# Patient Record
Sex: FEMALE | Race: WHITE
[De-identification: names, ages, dates, MRNs, and addresses within clinical notes are randomized per-mention and may not be internally consistent; named-entity substitution may affect disease eponyms.]

---

## 2018-04-10 ENCOUNTER — RESULT REVIEW (OUTPATIENT)
Age: 48
End: 2018-04-10

## 2020-07-21 ENCOUNTER — EMERGENCY (EMERGENCY)
Facility: HOSPITAL | Age: 50
LOS: 0 days | Discharge: HOME | End: 2020-07-21
Attending: EMERGENCY MEDICINE
Payer: COMMERCIAL

## 2020-07-21 ENCOUNTER — TRANSCRIPTION ENCOUNTER (OUTPATIENT)
Age: 50
End: 2020-07-21

## 2020-07-21 VITALS — WEIGHT: 210.1 LBS | HEIGHT: 65 IN

## 2020-07-21 VITALS
OXYGEN SATURATION: 100 % | RESPIRATION RATE: 17 BRPM | SYSTOLIC BLOOD PRESSURE: 148 MMHG | HEART RATE: 102 BPM | DIASTOLIC BLOOD PRESSURE: 97 MMHG

## 2020-07-21 DIAGNOSIS — E03.9 HYPOTHYROIDISM, UNSPECIFIED: ICD-10-CM

## 2020-07-21 DIAGNOSIS — E87.6 HYPOKALEMIA: ICD-10-CM

## 2020-07-21 DIAGNOSIS — R00.2 PALPITATIONS: ICD-10-CM

## 2020-07-21 DIAGNOSIS — I47.1 SUPRAVENTRICULAR TACHYCARDIA: ICD-10-CM

## 2020-07-21 DIAGNOSIS — Z91.14 PATIENT'S OTHER NONCOMPLIANCE WITH MEDICATION REGIMEN: ICD-10-CM

## 2020-07-21 LAB
ADD ON TEST-SPECIMEN IN LAB: SIGNIFICANT CHANGE UP
ALBUMIN SERPL ELPH-MCNC: 3.2 G/DL — LOW (ref 3.3–5)
ALP SERPL-CCNC: 68 U/L — SIGNIFICANT CHANGE UP (ref 40–120)
ALT FLD-CCNC: 16 U/L — SIGNIFICANT CHANGE UP (ref 12–78)
ANION GAP SERPL CALC-SCNC: 6 MMOL/L — SIGNIFICANT CHANGE UP (ref 5–17)
APTT BLD: 30.9 SEC — SIGNIFICANT CHANGE UP (ref 27.5–35.5)
AST SERPL-CCNC: 14 U/L — LOW (ref 15–37)
BASOPHILS # BLD AUTO: 0.07 K/UL — SIGNIFICANT CHANGE UP (ref 0–0.2)
BASOPHILS NFR BLD AUTO: 0.8 % — SIGNIFICANT CHANGE UP (ref 0–2)
BILIRUB SERPL-MCNC: 0.3 MG/DL — SIGNIFICANT CHANGE UP (ref 0.2–1.2)
BUN SERPL-MCNC: 13 MG/DL — SIGNIFICANT CHANGE UP (ref 7–23)
CALCIUM SERPL-MCNC: 8.2 MG/DL — LOW (ref 8.5–10.1)
CHLORIDE SERPL-SCNC: 107 MMOL/L — SIGNIFICANT CHANGE UP (ref 96–108)
CO2 SERPL-SCNC: 25 MMOL/L — SIGNIFICANT CHANGE UP (ref 22–31)
CREAT SERPL-MCNC: 0.98 MG/DL — SIGNIFICANT CHANGE UP (ref 0.5–1.3)
EOSINOPHIL # BLD AUTO: 0.32 K/UL — SIGNIFICANT CHANGE UP (ref 0–0.5)
EOSINOPHIL NFR BLD AUTO: 3.5 % — SIGNIFICANT CHANGE UP (ref 0–6)
GLUCOSE SERPL-MCNC: 96 MG/DL — SIGNIFICANT CHANGE UP (ref 70–99)
HCT VFR BLD CALC: 35.3 % — SIGNIFICANT CHANGE UP (ref 34.5–45)
HGB BLD-MCNC: 11.4 G/DL — LOW (ref 11.5–15.5)
IMM GRANULOCYTES NFR BLD AUTO: 0.3 % — SIGNIFICANT CHANGE UP (ref 0–1.5)
INR BLD: 1.13 RATIO — SIGNIFICANT CHANGE UP (ref 0.88–1.16)
LYMPHOCYTES # BLD AUTO: 2.55 K/UL — SIGNIFICANT CHANGE UP (ref 1–3.3)
LYMPHOCYTES # BLD AUTO: 28.1 % — SIGNIFICANT CHANGE UP (ref 13–44)
MCHC RBC-ENTMCNC: 26.8 PG — LOW (ref 27–34)
MCHC RBC-ENTMCNC: 32.3 GM/DL — SIGNIFICANT CHANGE UP (ref 32–36)
MCV RBC AUTO: 83.1 FL — SIGNIFICANT CHANGE UP (ref 80–100)
MONOCYTES # BLD AUTO: 0.48 K/UL — SIGNIFICANT CHANGE UP (ref 0–0.9)
MONOCYTES NFR BLD AUTO: 5.3 % — SIGNIFICANT CHANGE UP (ref 2–14)
NEUTROPHILS # BLD AUTO: 5.62 K/UL — SIGNIFICANT CHANGE UP (ref 1.8–7.4)
NEUTROPHILS NFR BLD AUTO: 62 % — SIGNIFICANT CHANGE UP (ref 43–77)
NT-PROBNP SERPL-SCNC: 91 PG/ML — SIGNIFICANT CHANGE UP (ref 0–125)
PLATELET # BLD AUTO: 224 K/UL — SIGNIFICANT CHANGE UP (ref 150–400)
POTASSIUM SERPL-MCNC: 3.3 MMOL/L — LOW (ref 3.5–5.3)
POTASSIUM SERPL-SCNC: 3.3 MMOL/L — LOW (ref 3.5–5.3)
PROT SERPL-MCNC: 8.9 GM/DL — HIGH (ref 6–8.3)
PROTHROM AB SERPL-ACNC: 13.1 SEC — SIGNIFICANT CHANGE UP (ref 10.6–13.6)
RBC # BLD: 4.25 M/UL — SIGNIFICANT CHANGE UP (ref 3.8–5.2)
RBC # FLD: 14.5 % — SIGNIFICANT CHANGE UP (ref 10.3–14.5)
SODIUM SERPL-SCNC: 138 MMOL/L — SIGNIFICANT CHANGE UP (ref 135–145)
TROPONIN I SERPL-MCNC: <0.015 NG/ML — SIGNIFICANT CHANGE UP (ref 0.01–0.04)
TSH SERPL-MCNC: 46.3 UU/ML — HIGH (ref 0.34–4.82)
WBC # BLD: 9.07 K/UL — SIGNIFICANT CHANGE UP (ref 3.8–10.5)
WBC # FLD AUTO: 9.07 K/UL — SIGNIFICANT CHANGE UP (ref 3.8–10.5)

## 2020-07-21 PROCEDURE — 85025 COMPLETE CBC W/AUTO DIFF WBC: CPT

## 2020-07-21 PROCEDURE — 84439 ASSAY OF FREE THYROXINE: CPT

## 2020-07-21 PROCEDURE — 84480 ASSAY TRIIODOTHYRONINE (T3): CPT

## 2020-07-21 PROCEDURE — 36415 COLL VENOUS BLD VENIPUNCTURE: CPT

## 2020-07-21 PROCEDURE — 93010 ELECTROCARDIOGRAM REPORT: CPT

## 2020-07-21 PROCEDURE — 99285 EMERGENCY DEPT VISIT HI MDM: CPT

## 2020-07-21 PROCEDURE — 80053 COMPREHEN METABOLIC PANEL: CPT

## 2020-07-21 PROCEDURE — 84484 ASSAY OF TROPONIN QUANT: CPT

## 2020-07-21 PROCEDURE — 93005 ELECTROCARDIOGRAM TRACING: CPT

## 2020-07-21 PROCEDURE — 85610 PROTHROMBIN TIME: CPT

## 2020-07-21 PROCEDURE — 85730 THROMBOPLASTIN TIME PARTIAL: CPT

## 2020-07-21 PROCEDURE — 99284 EMERGENCY DEPT VISIT MOD MDM: CPT | Mod: 25

## 2020-07-21 PROCEDURE — 84443 ASSAY THYROID STIM HORMONE: CPT

## 2020-07-21 PROCEDURE — 71045 X-RAY EXAM CHEST 1 VIEW: CPT | Mod: 26

## 2020-07-21 PROCEDURE — 83880 ASSAY OF NATRIURETIC PEPTIDE: CPT

## 2020-07-21 PROCEDURE — 71045 X-RAY EXAM CHEST 1 VIEW: CPT

## 2020-07-21 RX ORDER — POTASSIUM CHLORIDE 20 MEQ
40 PACKET (EA) ORAL ONCE
Refills: 0 | Status: COMPLETED | OUTPATIENT
Start: 2020-07-21 | End: 2020-07-21

## 2020-07-21 RX ORDER — ASPIRIN/CALCIUM CARB/MAGNESIUM 324 MG
162 TABLET ORAL ONCE
Refills: 0 | Status: COMPLETED | OUTPATIENT
Start: 2020-07-21 | End: 2020-07-21

## 2020-07-21 RX ORDER — METOPROLOL TARTRATE 50 MG
1 TABLET ORAL
Qty: 30 | Refills: 5
Start: 2020-07-21 | End: 2021-01-16

## 2020-07-21 RX ADMIN — Medication 40 MILLIEQUIVALENT(S): at 16:51

## 2020-07-21 RX ADMIN — Medication 162 MILLIGRAM(S): at 16:50

## 2020-07-21 NOTE — CONSULT NOTE ADULT - SUBJECTIVE AND OBJECTIVE BOX
HPI:    50y Female who was admiited for  rapid heart rate.  She was found to be in SVT, was given Adenosine 6 mgs in the ambulance, which thn converted to SR-St.  She said she had a very bad week and was feeling stressed with her 4 children, COVID and health issues with her mother.  Her heart started to race, when she was in the ambulance they told her that her mother had a cardiac arrest at [facility]  today. She has a hsitory of hypothyroidism and hasn't taken her Thyroid medication in over a wekk. In the ED her Potassium level is 3.3/    PAST MEDICAL & SURGICAL HISTORY:  Hypothyroidism          MEDICATIONS  (STANDING):  aspirin  chewable 162 milliGRAM(s) Oral once  potassium chloride    Tablet ER 40 milliEquivalent(s) Oral once    MEDICATIONS  (PRN):      Allergies    No Known Allergies    Intolerances        SOCIAL HISTORY: Denies tobacco, etoh abuse or illicit drug use    FAMILY HISTORY:      Vital Signs Last 24 Hrs  T(C): 36.9 (21 Jul 2020 15:49), Max: 36.9 (21 Jul 2020 15:49)  T(F): 98.5 (21 Jul 2020 15:49), Max: 98.5 (21 Jul 2020 15:49)  HR: 113 (21 Jul 2020 15:49) (113 - 113)  BP: 134/59 (21 Jul 2020 15:49) (134/59 - 134/59)  BP(mean): 82 (21 Jul 2020 15:49) (82 - 82)  RR: 18 (21 Jul 2020 15:49) (18 - 18)  SpO2: 99% (21 Jul 2020 15:49) (99% - 99%)    REVIEW OF SYSTEMS:    CONSTITUTIONAL:  As per HPI.  HEENT:  Eyes:  No diplopia or blurred vision. ENT:  No earache, sore throat or runny nose.  CARDIOVASCULAR:  No pressure, squeezing, strangling, tightness, heaviness or aching about the chest, neck, axilla or epigastrium.  RESPIRATORY:  No cough, shortness of breath, PND or orthopnea.  GASTROINTESTINAL:  No nausea, vomiting or diarrhea.  GENITOURINARY:  No dysuria, frequency or urgency.  MUSCULOSKELETAL:  As per HPI.  SKIN:  No change in skin, hair or nails.  NEUROLOGIC:  No paresthesias, fasciculations, seizures or weakness.  PSYCHIATRIC:  No disorder of thought or mood.  ENDOCRINE:  No heat or cold intolerance, polyuria or polydipsia.  HEMATOLOGICAL:  No easy bruising or bleedings:  .     PHYSICAL EXAMINATION:    GENERAL APPEARANCE:  Pt. is not currently dyspneic, in no distress. Pt. is alert, oriented, and pleasant.  HEENT:  Pupils are normal and react normally. No icterus. Mucous membranes well colored.  NECK:  Supple. No lymphadenopathy. Jugular venous pressure not elevated. Carotids equal.   HEART:   The cardiac impulse has a normal quality. There are no murmurs, rubs or gallops noted  CHEST:  Chest is clear to auscultation. Normal respiratory effort.  ABDOMEN:  Soft and nontender.   EXTREMITIES:  There is no edema.   SKIN:  No rash or significant lesions are noted.    I&O's Summary      LABS:                        11.4   9.07  )-----------( 224      ( 21 Jul 2020 15:51 )             35.3     07-21    138  |  107  |  13  ----------------------------<  96  3.3<L>   |  25  |  0.98    Ca    8.2<L>      21 Jul 2020 15:51    TPro  8.9<H>  /  Alb  3.2<L>  /  TBili  0.3  /  DBili  x   /  AST  14<L>  /  ALT  16  /  AlkPhos  68  07-21    LIVER FUNCTIONS - ( 21 Jul 2020 15:51 )  Alb: 3.2 g/dL / Pro: 8.9 gm/dL / ALK PHOS: 68 U/L / ALT: 16 U/L / AST: 14 U/L / GGT: x           PT/INR - ( 21 Jul 2020 15:51 )   PT: 13.1 sec;   INR: 1.13 ratio         PTT - ( 21 Jul 2020 15:51 )  PTT:30.9 sec  CARDIAC MARKERS ( 21 Jul 2020 15:51 )  <0.015 ng/mL / x     / x     / x     / x                EKG:   BPM with ST t waves abnormalities ( pt had SVT- and was given Adenosine).    TELEMETRY:  -105 BPM, no ectopic beats    CARDIAC TESTS:    RADIOLOGY & ADDITIONAL STUDIES:

## 2020-07-21 NOTE — ED PROVIDER NOTE - NSFOLLOWUPINSTRUCTIONS_ED_ALL_ED_FT
Supraventricular Tachycardia, Adult  Supraventricular tachycardia (SVT) is a type of abnormal heart rhythm. It causes the heart to beat very quickly and then return to a normal speed.  A normal resting heart rate is 60–100 beats per minute. During an episode of SVT, your heart rate may be higher than 150 beats per minute. Episodes of SVT can be frightening, but they are usually not dangerous. However, if episodes happen several times per day or last longer than a few seconds, they may lead to heart failure.  What are the causes?     Usually, a normal heartbeat starts when an area called the sinoatrial node releases an electrical signal. In SVT, other areas of the heart send out electrical signals that interfere with the signal from the sinoatrial node. It is not known why some people get SVT and others do not.  What increases the risk?  You are more likely to develop this condition if you are:  12–30 years old.A woman.The following factors may make you more likely to develop this condition:  Stress.Tiredness.Smoking.Stimulant drugs, such as cocaine and methamphetamine.Alcohol.Caffeine.Pregnancy.Anxiety.What are the signs or symptoms?  Symptoms of this condition include:  A pounding heart.A feeling that the heart is skipping beats (palpitations).Weakness.Shortness of breath.Tightness or pain in your chest.Light-headedness.Anxiety.Dizziness.Sweating.Nausea.Fainting.Fatigue or tiredness.A mild episode may not cause symptoms.  How is this diagnosed?  This condition may be diagnosed based on:  Your symptoms.A physical exam.  If you have an episode of SVT during the exam, the health care provider may be able to diagnose SVT by listening to your heart and feeling your pulse.Tests. These may include:  An electrocardiogram (ECG). This test is done to check for problems with electrical activity in the heart.A Holter monitor or event monitor test. This test involves wearing a portable device that monitors your heart rate over time.An echocardiogram. This test involves taking an image of your heart using sound waves. It is done to rule out other causes of a fast heart rate.Blood tests.How is this treated?  This condition may be treated with:  Vagal nerve stimulation. The treatment involves stimulating your vagus nerve, which slows down the heart. It is often the first and only treatment that is needed for this condition. Work with your health care provider to find which one works best for you. Ways to do this treatment include:  Holding your breath and pushing, as though you are having a bowel movement.Massaging an area on one side of your neck, below your jaw. Do not try this yourself. Only a health care provider should do this. If done the wrong way, it can lead to a stroke.Bending forward with your head between your legs.Coughing while bending forward with your head between your legs.Closing your eyes and massaging your eyeballs. A health care provider should guide you through this method before you try it on your own.Medicines that prevent attacks.Medicine to stop an attack. The medicine is given through an IV at the hospital.A small electric shock (cardioversion) that stops an attack. Before you get the shock, you will get medicine to make you fall asleep.Radiofrequency ablation. In this procedure, a small, thin tube (catheter) is used to send radiofrequency energy to the area of tissue that is causing the rapid heartbeats. The energy kills the cells and helps your heart keep a normal rhythm. You may have this treatment if you have symptoms of SVT often.If you do not have symptoms, you may not need treatment.  Follow these instructions at home:  Stress     Avoid stressful situations when possible.Find healthy ways of managing stress that work for you. Some healthy ways to manage stress include:  Taking part in relaxing activities, such as yoga, meditation, or being out in nature.Listening to relaxing music.Practicing relaxation techniques, such as deep breathing.Leading a healthy lifestyle. This involves getting plenty of sleep, exercising, and eating a balanced diet.Attending counseling or talk therapy with a mental health professional.Lifestyle        Try to get at least 7 hours of sleep each night.Do not use any products that contain nicotine or tobacco, such as cigarettes, e-cigarettes, and chewing tobacco. If you need help quitting, ask your health care provider.Be aware of how alcohol affects your condition. If alcohol:  Triggers episodes of SVT, do not drink alcohol.Does not seem to trigger episodes, limit alcohol intake to no more than 1 drink a day for nonpregnant women and 2 drinks a day for men. Be aware of how much alcohol is in your drink. In the [place] one drink equals one 12 oz bottle of beer (355 mL), one 5 oz glass of wine (148 mL), or one 1½ oz glass of hard liquor (44 mL).Be aware of how caffeine affects your condition. If caffeine:  Triggers episodes of SVT, do not eat, drink, or use anything with caffeine in it.Does not seem to trigger episodes, consume caffeine in moderation.Do not use stimulant drugs. If you need help quitting, talk with your health care provider.General instructions     Maintain a healthy weight.Exercise regularly. Ask your health care provider to suggest some good activities for you. Aim for one or a combination of the followin minutes per week of moderate exercise, such as walking or yoga.75 minutes per week of vigorous exercise, such as running or swimming.Perform vagus nerve stimulation as directed by your health care provider.Take over-the-counter and prescription medicines only as told by your health care provider.Keep all follow-up visits as told by your health care provider. This is important.Contact a health care provider if:  You have episodes of SVT more often than before.Episodes of SVT last longer than before.Vagus nerve stimulation is no longer helping.You have new symptoms.Get help right away if:  You have chest pain.Your symptoms get worse.You have trouble breathing.You have an episode of SVT that lasts longer than 20 minutes.You faint.These symptoms may represent a serious problem that is an emergency. Do not wait to see if the symptoms will go away. Get medical help right away. Call your local emergency services (911 in the [place] Do not drive yourself to the hospital.   Summary  Supraventricular tachycardia (SVT) is a type of abnormal heart rhythm.During an episode of SVT, your heart rate may be higher than 150 beats per minute.Treatment depends on frequency of occurrence and symptoms experienced.This information is not intended to replace advice given to you by your health care provider. Make sure you discuss any questions you have with your health care provider.    Document Released: 2006 Document Revised: 2019 Document Reviewed: 2019  Elsevier Patient Education ©  Elsevier Inc.

## 2020-07-21 NOTE — ED PROVIDER NOTE - OBJECTIVE STATEMENT
51 y/o female with  PMHx of hypothyroidism presents to the ED c/o rapid heart rate. Pt endorses feeling anxious, palpitations while driving, felt faint and pulled into parking lot of urgent care. They told her to com to ED. Pt received news of her mother going into cardiac arrest moments before being brought to ED. Pt reports similar episodes of "anxiety" but none as sever as this one. Was give 6 of adenosine at urgent care, HR brought down from 190's to low 100's.

## 2020-07-21 NOTE — ED ADULT TRIAGE NOTE - CHIEF COMPLAINT QUOTE
BIBA TO ED FROM URGENT CARE FOR RAPID HEART BEAT. WHEN EMS ARRIVED THEY REPORTED SVT 'S, ADENOSINE 6MG GIVEN, EMS STATED SVT BROKE. PT THEN RECEIVED A PHONE CALL STATING THAT PTS MOTHER WAS GETTING CPR @[facility] THEN HR ELEVATED BACK INTO 120'S. PT W/O SOB AND CHEST PAIN

## 2020-07-21 NOTE — ED PROVIDER NOTE - PATIENT PORTAL LINK FT
You can access the FollowMyHealth Patient Portal offered by [facility] by registering at the following website: [email or web address]. By joining "[employer]"’s FollowMyHealth portal, you will also be able to view your health information using other applications (apps) compatible with our system.

## 2020-07-21 NOTE — CONSULT NOTE ADULT - ASSESSMENT
ASSESSMENT & PLAN:  50 year old female who hasn't been complaint with thyroid medication and found to be hypokalemic.  She has been feeling very stressed.  She had had palpitations and was feeling near syncope  when she pulled into urgent care, she was in her car. The  ambulances called   given Adenosine 6 mgs for SVT in the ambulance and then  she was notified that her mother had a cardiac arrest today.  She is anxious to get discharged and see her mother.  Troponin Level was Normal .    She will f/u with Dr. Marr as an out-patient  She will increase potassium rich foods in her diet  She will be compliant with her Thyroid medication  A Rx for Metoprolol Tartrate 25 mgs will be called into her pharmacy for break through episodes  of SVT  Avoid caffeine and stimulants       Thank-you for letting the EP Service  participate in the care of your patient.

## 2020-07-27 PROBLEM — E03.9 HYPOTHYROIDISM, UNSPECIFIED: Chronic | Status: ACTIVE | Noted: 2020-07-21

## 2020-08-04 PROBLEM — Z00.00 ENCOUNTER FOR PREVENTIVE HEALTH EXAMINATION: Status: ACTIVE | Noted: 2020-08-04

## 2020-08-04 RX ORDER — METOPROLOL TARTRATE 25 MG/1
25 TABLET, FILM COATED ORAL
Refills: 0 | Status: ACTIVE | COMMUNITY
Start: 2020-08-04

## 2020-08-04 RX ORDER — LEVOTHYROXINE SODIUM 0.15 MG/1
150 TABLET ORAL DAILY
Refills: 0 | Status: ACTIVE | COMMUNITY
Start: 2020-08-04 | End: 1900-01-01

## 2020-08-07 ENCOUNTER — NON-APPOINTMENT (OUTPATIENT)
Age: 50
End: 2020-08-07

## 2020-08-07 ENCOUNTER — APPOINTMENT (OUTPATIENT)
Dept: ELECTROPHYSIOLOGY | Facility: CLINIC | Age: 50
End: 2020-08-07
Payer: COMMERCIAL

## 2020-08-07 VITALS
SYSTOLIC BLOOD PRESSURE: 136 MMHG | OXYGEN SATURATION: 99 % | DIASTOLIC BLOOD PRESSURE: 76 MMHG | HEART RATE: 82 BPM | HEIGHT: 66 IN | WEIGHT: 213 LBS | BODY MASS INDEX: 34.23 KG/M2

## 2020-08-07 DIAGNOSIS — I47.1 SUPRAVENTRICULAR TACHYCARDIA: ICD-10-CM

## 2020-08-07 DIAGNOSIS — E03.9 HYPOTHYROIDISM, UNSPECIFIED: ICD-10-CM

## 2020-08-07 PROCEDURE — 93000 ELECTROCARDIOGRAM COMPLETE: CPT

## 2020-08-07 PROCEDURE — 99205 OFFICE O/P NEW HI 60 MIN: CPT

## 2020-08-07 RX ORDER — LEVOTHYROXINE SODIUM 150 UG/1
150 TABLET ORAL DAILY
Qty: 60 | Refills: 0 | Status: ACTIVE | COMMUNITY
Start: 2020-08-07 | End: 1900-01-01

## 2020-08-07 NOTE — DISCUSSION/SUMMARY
[FreeTextEntry1] : 50y Female who has hypothyroidism was admiited to  for rapid heart rate on 7/21/2020. She was found to be in SVT, was given Adenosine 6mg in the ambulance and converted to SR. 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 Discussed with her that EPS and ablation as a definitive therapy is appropriate option vs watchful waiting and continuing toprolol prn is ok as well. \par pt would like to continue with metoprolol only for now\par TTE to evaluate for LV function\par hypothyroidism continue synthroid and follow with PCP will refer to Dr Wright\par r/o HUNTER will refer for sleep study\par rtc in 6 month or earlier if symptomatic

## 2020-08-07 NOTE — HISTORY OF PRESENT ILLNESS
[FreeTextEntry1] : 50y Female who has hypothyroidism was admiited to  for rapid heart rate on 7/21/2020. She was found to be in SVT, was given Adenosine 6 mg in the ambulance and converted to SR-St. She said she had a very bad week and was feeling stressed. She feels overwhelmed and sad, her mother passed away recently.  She takes metoprolol xl 25mg prn. She has palpitations every 6 months or so and they last up to 30 minutes terminates with bearing down usually. Last SVT episode was long and she could not terminate on her own. Denies chest pain, syncope, sob. \par ECG 8/7/20 shows SR nmp CT qrs

## 2020-08-07 NOTE — PHYSICAL EXAM
[Normal Appearance] : normal appearance [Well Groomed] : well groomed [Normal Conjunctiva] : the conjunctiva exhibited no abnormalities [Heart Rate And Rhythm] : heart rate and rhythm were normal [Normal Oral Mucosa] : normal oral mucosa [Heart Sounds] : normal S1 and S2 [Murmurs] : no murmurs present [] : no respiratory distress [Abnormal Walk] : normal gait [Bowel Sounds] : normal bowel sounds [Skin Color & Pigmentation] : normal skin color and pigmentation [Nail Clubbing] : no clubbing of the fingernails [Skin Turgor] : normal skin turgor [Oriented To Time, Place, And Person] : oriented to person, place, and time [Affect] : the affect was normal [Impaired Insight] : insight and judgment were intact

## 2020-10-05 ENCOUNTER — RX CHANGE (OUTPATIENT)
Age: 50
End: 2020-10-05

## 2022-05-10 ENCOUNTER — APPOINTMENT (OUTPATIENT)
Dept: OBGYN | Facility: CLINIC | Age: 52
End: 2022-05-10

## 2024-02-10 ENCOUNTER — EMERGENCY (EMERGENCY)
Facility: HOSPITAL | Age: 54
LOS: 0 days | Discharge: HOME | End: 2024-02-10
Attending: STUDENT IN AN ORGANIZED HEALTH CARE EDUCATION/TRAINING PROGRAM
Payer: COMMERCIAL

## 2024-02-10 VITALS
OXYGEN SATURATION: 100 % | TEMPERATURE: 98 F | HEART RATE: 62 BPM | RESPIRATION RATE: 18 BRPM | DIASTOLIC BLOOD PRESSURE: 85 MMHG | SYSTOLIC BLOOD PRESSURE: 128 MMHG

## 2024-02-10 VITALS — WEIGHT: 210.1 LBS | HEIGHT: 67 IN

## 2024-02-10 DIAGNOSIS — R10.13 EPIGASTRIC PAIN: ICD-10-CM

## 2024-02-10 DIAGNOSIS — Z87.19 PERSONAL HISTORY OF OTHER DISEASES OF THE DIGESTIVE SYSTEM: ICD-10-CM

## 2024-02-10 DIAGNOSIS — E03.9 HYPOTHYROIDISM, UNSPECIFIED: ICD-10-CM

## 2024-02-10 DIAGNOSIS — R11.2 NAUSEA WITH VOMITING, UNSPECIFIED: ICD-10-CM

## 2024-02-10 PROCEDURE — 99282 EMERGENCY DEPT VISIT SF MDM: CPT

## 2024-02-10 PROCEDURE — 99283 EMERGENCY DEPT VISIT LOW MDM: CPT

## 2024-02-10 NOTE — ED STATDOCS - PROGRESS NOTE DETAILS
Patient seen and examined at bedside, she reports this morning she felt like something was stuck when she swallowed, this is now relieved. She would like to try water/crackers.  PE: non toxic, abd soft NTND no guarding  PO challenge pending Patient tolerating water and crackers without issue, no further vomiting or pain. She wants to go home and follow up with her doctor, does not want further w/u here. Discussion includes plan and return precautions. Pt advised to f/u with PMD 1-2 days and specialists discussed.  Pt verbalized understanding/agreement of plan.

## 2024-02-10 NOTE — ED STATDOCS - NSFOLLOWUPINSTRUCTIONS_ED_ALL_ED_FT
- Please follow up with your Primary Care Doctor in 1 - 2 days. If you cannot follow-up with your primary care doctor please return to the Emergency Department for any urgent issues.  - Seek immediate medical care for any new, worsening or concerning signs or symptoms.   - Follow up with your surgeon and gastroenterologist     - If you have difficulty following up, please call: [phone]) or go to [email or web address] to obtain a [facility] doctor or specialist who takes your insurance in your area.    Feel better!

## 2024-02-10 NOTE — ED ADULT TRIAGE NOTE - CHIEF COMPLAINT QUOTE
patient has known hiatal hernia; reports that anything she swallows she feels like it is stuck and trying to come back up since this morning. endorsing intermittent mid chest discomfort. follow with GI.

## 2024-02-10 NOTE — ED ADULT NURSE NOTE - CHIEF COMPLAINT
The patient is a 53y Female complaining of see chief complaint quote.
0 (no pain/absence of nonverbal indicators of pain)

## 2024-02-10 NOTE — ED STATDOCS - PHYSICAL EXAMINATION
GENERAL: A&Ox4, non-toxic appearing, no acute distress  HEENT: NCAT, EOMI, oral mucosa moist, normal conjunctiva  RESP: no respiratory distress, speaking in full sentences  CV: RRR  ABDOMEN: soft, non-tender, non-distended, no guarding, no rebound tenderness  MSK: no visible deformities  NEURO: no focal sensory or motor deficits, CN 2-12 grossly intact  SKIN: warm, normal color, well perfused, no rash  PSYCH: normal affect

## 2024-02-10 NOTE — ED STATDOCS - OBJECTIVE STATEMENT
52 yo female w/PMHx hypothyroidism and known hiatal hernia presents to the ED reporting that it feels like when she swallows something it gets stuck and is trying to come back up. Pt ate this morning and drank a small amount of water, and was not able to keep it down. Had 1 episode of vomiting PTA. In the ED feels like she had to vomit and then when she tried to she felt "something move", that has provided her some relief. Pt is on Omeprazol. 54 yo female w/PMHx hypothyroidism and known hiatal hernia presents to the ED reporting that it feels like when she swallows something it gets stuck and is trying to come back up. Pt ate this morning and drank a small amount of water, and was not able to keep it down. Had 1 episode of vomiting PTA. In the ED feels like she had to vomit and then when she tried to she felt "something move", that has provided her some relief. Pt is on Omeprazole.

## 2024-02-10 NOTE — ED ADULT NURSE NOTE - OBJECTIVE STATEMENT
pt has a known history of hiatal hernia, states that anything that she tries to eat "comes back up" endorses mild upper abdominal discomfort. denies cp/fever/ v/d/sob.

## 2024-02-10 NOTE — ED STATDOCS - PATIENT PORTAL LINK FT
You can access the FollowMyHealth Patient Portal offered by [facility] by registering at the following website: [email or web address]. By joining [employer]’s FollowMyHealth portal, you will also be able to view your health information using other applications (apps) compatible with our system.

## 2024-11-21 ENCOUNTER — APPOINTMENT (OUTPATIENT)
Dept: OBGYN | Facility: CLINIC | Age: 54
End: 2024-11-21

## 2024-11-21 PROCEDURE — 99396 PREV VISIT EST AGE 40-64: CPT

## 2024-11-21 PROCEDURE — 99459 PELVIC EXAMINATION: CPT

## 2024-11-21 PROCEDURE — 82270 OCCULT BLOOD FECES: CPT

## 2024-11-21 PROCEDURE — 81002 URINALYSIS NONAUTO W/O SCOPE: CPT

## 2024-12-31 ENCOUNTER — INPATIENT (INPATIENT)
Facility: HOSPITAL | Age: 54
LOS: 2 days | Discharge: HOME | DRG: 310 | End: 2025-01-03
Attending: HOSPITALIST | Admitting: FAMILY MEDICINE
Payer: COMMERCIAL

## 2024-12-31 VITALS
WEIGHT: 199.96 LBS | OXYGEN SATURATION: 94 % | SYSTOLIC BLOOD PRESSURE: 94 MMHG | HEART RATE: 178 BPM | HEIGHT: 67 IN | DIASTOLIC BLOOD PRESSURE: 72 MMHG

## 2024-12-31 DIAGNOSIS — I47.10 SUPRAVENTRICULAR TACHYCARDIA, UNSPECIFIED: ICD-10-CM

## 2024-12-31 DIAGNOSIS — Z96.7 PRESENCE OF OTHER BONE AND TENDON IMPLANTS: Chronic | ICD-10-CM

## 2024-12-31 DIAGNOSIS — R79.89 OTHER SPECIFIED ABNORMAL FINDINGS OF BLOOD CHEMISTRY: ICD-10-CM

## 2024-12-31 LAB
ADD ON TEST-SPECIMEN IN LAB: SIGNIFICANT CHANGE UP
ALBUMIN SERPL ELPH-MCNC: 3.1 G/DL — LOW (ref 3.3–5)
ALP SERPL-CCNC: 76 U/L — SIGNIFICANT CHANGE UP (ref 40–120)
ALT FLD-CCNC: 18 U/L — SIGNIFICANT CHANGE UP (ref 12–78)
ANION GAP SERPL CALC-SCNC: 5 MMOL/L — SIGNIFICANT CHANGE UP (ref 5–17)
APTT BLD: 29.8 SEC — SIGNIFICANT CHANGE UP (ref 24.5–35.6)
AST SERPL-CCNC: 21 U/L — SIGNIFICANT CHANGE UP (ref 15–37)
BASOPHILS # BLD AUTO: 0.07 K/UL — SIGNIFICANT CHANGE UP (ref 0–0.2)
BASOPHILS NFR BLD AUTO: 0.8 % — SIGNIFICANT CHANGE UP (ref 0–2)
BILIRUB SERPL-MCNC: 0.2 MG/DL — SIGNIFICANT CHANGE UP (ref 0.2–1.2)
BUN SERPL-MCNC: 22 MG/DL — SIGNIFICANT CHANGE UP (ref 7–23)
CALCIUM SERPL-MCNC: 8.6 MG/DL — SIGNIFICANT CHANGE UP (ref 8.5–10.1)
CHLORIDE SERPL-SCNC: 106 MMOL/L — SIGNIFICANT CHANGE UP (ref 96–108)
CK SERPL-CCNC: 140 U/L — SIGNIFICANT CHANGE UP (ref 26–192)
CO2 SERPL-SCNC: 24 MMOL/L — SIGNIFICANT CHANGE UP (ref 22–31)
CREAT SERPL-MCNC: 1.26 MG/DL — SIGNIFICANT CHANGE UP (ref 0.5–1.3)
D DIMER BLD IA.RAPID-MCNC: 207 NG/ML DDU — SIGNIFICANT CHANGE UP
EGFR: 50 ML/MIN/1.73M2 — LOW
EOSINOPHIL # BLD AUTO: 0.31 K/UL — SIGNIFICANT CHANGE UP (ref 0–0.5)
EOSINOPHIL NFR BLD AUTO: 3.5 % — SIGNIFICANT CHANGE UP (ref 0–6)
FLUAV AG NPH QL: SIGNIFICANT CHANGE UP
FLUBV AG NPH QL: SIGNIFICANT CHANGE UP
GLUCOSE SERPL-MCNC: 133 MG/DL — HIGH (ref 70–99)
HCT VFR BLD CALC: 36.4 % — SIGNIFICANT CHANGE UP (ref 34.5–45)
HGB BLD-MCNC: 11.7 G/DL — SIGNIFICANT CHANGE UP (ref 11.5–15.5)
IMM GRANULOCYTES NFR BLD AUTO: 0.5 % — SIGNIFICANT CHANGE UP (ref 0–0.9)
INR BLD: 1.07 RATIO — SIGNIFICANT CHANGE UP (ref 0.85–1.16)
LYMPHOCYTES # BLD AUTO: 2.89 K/UL — SIGNIFICANT CHANGE UP (ref 1–3.3)
LYMPHOCYTES # BLD AUTO: 32.7 % — SIGNIFICANT CHANGE UP (ref 13–44)
MAGNESIUM SERPL-MCNC: 2.2 MG/DL — SIGNIFICANT CHANGE UP (ref 1.6–2.6)
MCHC RBC-ENTMCNC: 27.3 PG — SIGNIFICANT CHANGE UP (ref 27–34)
MCHC RBC-ENTMCNC: 32.1 G/DL — SIGNIFICANT CHANGE UP (ref 32–36)
MCV RBC AUTO: 84.8 FL — SIGNIFICANT CHANGE UP (ref 80–100)
MONOCYTES # BLD AUTO: 0.53 K/UL — SIGNIFICANT CHANGE UP (ref 0–0.9)
MONOCYTES NFR BLD AUTO: 6 % — SIGNIFICANT CHANGE UP (ref 2–14)
NEUTROPHILS # BLD AUTO: 4.99 K/UL — SIGNIFICANT CHANGE UP (ref 1.8–7.4)
NEUTROPHILS NFR BLD AUTO: 56.5 % — SIGNIFICANT CHANGE UP (ref 43–77)
NT-PROBNP SERPL-SCNC: 786 PG/ML — HIGH (ref 0–125)
PLATELET # BLD AUTO: 225 K/UL — SIGNIFICANT CHANGE UP (ref 150–400)
POTASSIUM SERPL-MCNC: 3.8 MMOL/L — SIGNIFICANT CHANGE UP (ref 3.5–5.3)
POTASSIUM SERPL-SCNC: 3.8 MMOL/L — SIGNIFICANT CHANGE UP (ref 3.5–5.3)
PROT SERPL-MCNC: 8.4 GM/DL — HIGH (ref 6–8.3)
PROTHROM AB SERPL-ACNC: 12.3 SEC — SIGNIFICANT CHANGE UP (ref 9.9–13.4)
RBC # BLD: 4.29 M/UL — SIGNIFICANT CHANGE UP (ref 3.8–5.2)
RBC # FLD: 15.2 % — HIGH (ref 10.3–14.5)
RSV RNA NPH QL NAA+NON-PROBE: DETECTED
SARS-COV-2 RNA SPEC QL NAA+PROBE: SIGNIFICANT CHANGE UP
SODIUM SERPL-SCNC: 135 MMOL/L — SIGNIFICANT CHANGE UP (ref 135–145)
T4 FREE SERPL-MCNC: 0.4 NG/DL — LOW (ref 0.93–1.7)
TROPONIN I, HIGH SENSITIVITY RESULT: 151.98 NG/L — HIGH
TROPONIN I, HIGH SENSITIVITY RESULT: 297.93 NG/L — HIGH
TSH SERPL-MCNC: 52.8 UU/ML — HIGH (ref 0.34–4.82)
WBC # BLD: 8.83 K/UL — SIGNIFICANT CHANGE UP (ref 3.8–10.5)
WBC # FLD AUTO: 8.83 K/UL — SIGNIFICANT CHANGE UP (ref 3.8–10.5)

## 2024-12-31 PROCEDURE — 36415 COLL VENOUS BLD VENIPUNCTURE: CPT

## 2024-12-31 PROCEDURE — 93017 CV STRESS TEST TRACING ONLY: CPT

## 2024-12-31 PROCEDURE — 78452 HT MUSCLE IMAGE SPECT MULT: CPT

## 2024-12-31 PROCEDURE — 99285 EMERGENCY DEPT VISIT HI MDM: CPT

## 2024-12-31 PROCEDURE — 99233 SBSQ HOSP IP/OBS HIGH 50: CPT

## 2024-12-31 PROCEDURE — 86376 MICROSOMAL ANTIBODY EACH: CPT

## 2024-12-31 PROCEDURE — G0378: CPT

## 2024-12-31 PROCEDURE — 83735 ASSAY OF MAGNESIUM: CPT

## 2024-12-31 PROCEDURE — A9500: CPT

## 2024-12-31 PROCEDURE — 85027 COMPLETE CBC AUTOMATED: CPT

## 2024-12-31 PROCEDURE — 84439 ASSAY OF FREE THYROXINE: CPT

## 2024-12-31 PROCEDURE — 99222 1ST HOSP IP/OBS MODERATE 55: CPT

## 2024-12-31 PROCEDURE — 84100 ASSAY OF PHOSPHORUS: CPT

## 2024-12-31 PROCEDURE — 93005 ELECTROCARDIOGRAM TRACING: CPT

## 2024-12-31 PROCEDURE — 93306 TTE W/DOPPLER COMPLETE: CPT

## 2024-12-31 PROCEDURE — 86800 THYROGLOBULIN ANTIBODY: CPT

## 2024-12-31 PROCEDURE — 84443 ASSAY THYROID STIM HORMONE: CPT

## 2024-12-31 PROCEDURE — 99449 NTRPROF PH1/NTRNET/EHR 31/>: CPT

## 2024-12-31 PROCEDURE — 80048 BASIC METABOLIC PNL TOTAL CA: CPT

## 2024-12-31 PROCEDURE — 93010 ELECTROCARDIOGRAM REPORT: CPT

## 2024-12-31 PROCEDURE — 84432 ASSAY OF THYROGLOBULIN: CPT

## 2024-12-31 PROCEDURE — 71045 X-RAY EXAM CHEST 1 VIEW: CPT | Mod: 26

## 2024-12-31 RX ORDER — LEVOTHYROXINE SODIUM 175 UG/1
50 TABLET ORAL DAILY
Refills: 0 | Status: COMPLETED | OUTPATIENT
Start: 2025-01-01 | End: 2025-01-02

## 2024-12-31 RX ORDER — DEXTROAMPHETAMINE SACCHARATE, AMPHETAMINE ASPARTATE, DEXTROAMPHETAMINE SULFATE, AND AMPHETAMINE SULFATE 2.5; 2.5; 2.5; 2.5 MG/1; MG/1; MG/1; MG/1
1 TABLET ORAL
Refills: 0 | DISCHARGE

## 2024-12-31 RX ORDER — ADENOSINE 3 MG/ML
6 INJECTION, SOLUTION INTRAVENOUS ONCE
Refills: 0 | Status: COMPLETED | OUTPATIENT
Start: 2024-12-31 | End: 2024-12-31

## 2024-12-31 RX ORDER — SODIUM CHLORIDE 9 MG/ML
1000 INJECTION, SOLUTION INTRAMUSCULAR; INTRAVENOUS; SUBCUTANEOUS ONCE
Refills: 0 | Status: COMPLETED | OUTPATIENT
Start: 2024-12-31 | End: 2024-12-31

## 2024-12-31 RX ORDER — LEVOTHYROXINE SODIUM 175 UG/1
25 TABLET ORAL ONCE
Refills: 0 | Status: COMPLETED | OUTPATIENT
Start: 2024-12-31 | End: 2024-12-31

## 2024-12-31 RX ORDER — ONDANSETRON 4 MG/1
4 TABLET ORAL EVERY 6 HOURS
Refills: 0 | Status: DISCONTINUED | OUTPATIENT
Start: 2024-12-31 | End: 2025-01-03

## 2024-12-31 RX ORDER — METOPROLOL TARTRATE 50 MG
25 TABLET ORAL
Refills: 0 | Status: DISCONTINUED | OUTPATIENT
Start: 2024-12-31 | End: 2025-01-03

## 2024-12-31 RX ORDER — ENOXAPARIN SODIUM 60 MG/.6ML
40 INJECTION INTRAVENOUS; SUBCUTANEOUS EVERY 24 HOURS
Refills: 0 | Status: DISCONTINUED | OUTPATIENT
Start: 2024-12-31 | End: 2025-01-03

## 2024-12-31 RX ORDER — ACETAMINOPHEN 80 MG/.8ML
650 SOLUTION/ DROPS ORAL EVERY 6 HOURS
Refills: 0 | Status: DISCONTINUED | OUTPATIENT
Start: 2024-12-31 | End: 2025-01-03

## 2024-12-31 RX ORDER — LEVOTHYROXINE SODIUM 175 UG/1
25 TABLET ORAL ONCE
Refills: 0 | Status: DISCONTINUED | OUTPATIENT
Start: 2024-12-31 | End: 2024-12-31

## 2024-12-31 RX ORDER — GINKGO BILOBA 40 MG
3 CAPSULE ORAL AT BEDTIME
Refills: 0 | Status: DISCONTINUED | OUTPATIENT
Start: 2024-12-31 | End: 2025-01-03

## 2024-12-31 RX ORDER — LEVOTHYROXINE SODIUM 175 UG/1
75 TABLET ORAL DAILY
Refills: 0 | Status: DISCONTINUED | OUTPATIENT
Start: 2025-01-03 | End: 2025-01-03

## 2024-12-31 RX ORDER — SODIUM CHLORIDE 9 MG/ML
500 INJECTION, SOLUTION INTRAMUSCULAR; INTRAVENOUS; SUBCUTANEOUS ONCE
Refills: 0 | Status: COMPLETED | OUTPATIENT
Start: 2024-12-31 | End: 2024-12-31

## 2024-12-31 RX ORDER — ADENOSINE 3 MG/ML
12 INJECTION, SOLUTION INTRAVENOUS ONCE
Refills: 0 | Status: COMPLETED | OUTPATIENT
Start: 2024-12-31 | End: 2024-12-31

## 2024-12-31 RX ORDER — LEVOTHYROXINE SODIUM 175 UG/1
50 TABLET ORAL ONCE
Refills: 0 | Status: COMPLETED | OUTPATIENT
Start: 2024-12-31 | End: 2024-12-31

## 2024-12-31 RX ORDER — MAG HYDROX/ALUMINUM HYD/SIMETH 200-200-20
30 SUSPENSION, ORAL (FINAL DOSE FORM) ORAL EVERY 4 HOURS
Refills: 0 | Status: DISCONTINUED | OUTPATIENT
Start: 2024-12-31 | End: 2025-01-03

## 2024-12-31 RX ADMIN — LEVOTHYROXINE SODIUM 50 MICROGRAM(S): 175 TABLET ORAL at 19:08

## 2024-12-31 RX ADMIN — SODIUM CHLORIDE 500 MILLILITER(S): 9 INJECTION, SOLUTION INTRAMUSCULAR; INTRAVENOUS; SUBCUTANEOUS at 06:14

## 2024-12-31 RX ADMIN — SODIUM CHLORIDE 1000 MILLILITER(S): 9 INJECTION, SOLUTION INTRAMUSCULAR; INTRAVENOUS; SUBCUTANEOUS at 03:58

## 2024-12-31 RX ADMIN — ADENOSINE 6 MILLIGRAM(S): 3 INJECTION, SOLUTION INTRAVENOUS at 04:09

## 2024-12-31 RX ADMIN — Medication 25 MILLIGRAM(S): at 21:38

## 2024-12-31 NOTE — ED ADULT NURSE NOTE - OBJECTIVE STATEMENT
Pt ambulatory to the ED w c/o palpitations. Pt is A&Ox3 and reports that she woke up feeling like her heart was racing. Denies chest pain, SOB, N/V/D at this time.

## 2024-12-31 NOTE — H&P ADULT - NSHPLABSRESULTS_GEN_ALL_CORE
Free Thyroxine, Serum (12.31.24 @ 06:26)   Free Thyroxine, Serum: 0.40 ng/dLThyroid Stimulating Hormone, Serum (12.31.24 @ 06:26)   Free Thyroxine, Serum: 0.60 ng/dL (07.21.20 @ 15:51)     Thyroid Stimulating Hormone, Serum: 52.80 uU/mL  Thyroid Stimulating Hormone, Serum (07.21.20 @ 15:51)   Thyroid Stimulating Hormone, Serum: 46.30 uU/mL

## 2024-12-31 NOTE — CONSULT NOTE ADULT - ASSESSMENT
Hypothyroidism     Do not need to give IV Levothyroxine as no evidence of myxedema  Pt with + troponin- per cardiology due to SVT but pt to get Echo   to start Levothyroxine 0.05 mg po qd - pt being given first dose now   and then can start tomorrow AM 0.05 mg first in AM no other pills  just with water--- wait 30 min for breakfast  or other meds/vitamins  can keep Levothyroxine 0.05 mg x 2 days then increase to 0.075 mg qd       - can check Thyroperoxidase Ab and thryoglobulin Ab TSH Free T4 tomorrow   then Thursday TSH free T$   If pt is to undergo anesthesia then may need to consider IV Levothyroxine therapy but at this time given  elevated troponin and no evidence of myxedema- would  recommend oral replacement therapy as above    if there are any further questions, please reach out to me

## 2024-12-31 NOTE — PROGRESS NOTE ADULT - SUBJECTIVE AND OBJECTIVE BOX
CC:    HPI:   54-year-old female with history of SVT in the past Which she states was several years ago, hypothyroidism presents for sudden onset of palpitations described as heart racing that began at 8 PM last night.  Patient states that this occurred at rest.  Patient notes that her "heart is pounding" but denies any pressure or pain.  Patient denies any nausea or vomiting.  Patient has had a cough for the last few days and tested positive for RSV.  Patient was concerned that cough medicine she was taking which may have  contain phenylephrine might lead to the  palpitations.  Patient was found to be in SVT with a heart rate of 178.  Attempted to convert to sinus rhythm using vagal maneuvers at bedside which were unsuccessful.      12/31: Called to a RR for SVT in the 150.       PAST MEDICAL & SURGICAL HISTORY:  Hypothyroidism          FAMILY HISTORY:      Social Hx:    Allergies    No Known Allergies    Intolerances        Height (cm): 170.2 (12-31 @ 03:20)  Weight (kg): 90.7 (12-31 @ 03:20)  BMI (kg/m2): 31.3 (12-31 @ 03:20)    ICU Vital Signs Last 24 Hrs  T(C): 36.7 (31 Dec 2024 16:17), Max: 36.7 (31 Dec 2024 04:00)  T(F): 98 (31 Dec 2024 16:17), Max: 98.1 (31 Dec 2024 04:00)  HR: 71 (31 Dec 2024 16:17) (67 - 178)  BP: 125/92 (31 Dec 2024 16:17) (77/59 - 125/92)  BP(mean): 101 (31 Dec 2024 16:17) (66 - 101)  ABP: --  ABP(mean): --  RR: 18 (31 Dec 2024 16:17) (16 - 18)  SpO2: 100% (31 Dec 2024 16:17) (94% - 100%)    O2 Parameters below as of 31 Dec 2024 16:17  Patient On (Oxygen Delivery Method): room air                I&O's Summary                            11.7   8.83  )-----------( 225      ( 31 Dec 2024 03:56 )             36.4       12-31    135  |  106  |  22  ----------------------------<  133[H]  3.8   |  24  |  1.26    Ca    8.6      31 Dec 2024 03:56  Mg     2.2     12-31    TPro  8.4[H]  /  Alb  3.1[L]  /  TBili  0.2  /  DBili  x   /  AST  21  /  ALT  18  /  AlkPhos  76  12-31                Urinalysis Basic - ( 31 Dec 2024 03:56 )    Color: x / Appearance: x / SG: x / pH: x  Gluc: 133 mg/dL / Ketone: x  / Bili: x / Urobili: x   Blood: x / Protein: x / Nitrite: x   Leuk Esterase: x / RBC: x / WBC x   Sq Epi: x / Non Sq Epi: x / Bacteria: x        MEDICATIONS  (STANDING):  metoprolol tartrate 25 milliGRAM(s) Oral two times a day    MEDICATIONS  (PRN):  acetaminophen     Tablet .. 650 milliGRAM(s) Oral every 6 hours PRN Mild Pain (1 - 3)  ondansetron Injectable 4 milliGRAM(s) IV Push every 6 hours PRN Nausea and/or Vomiting      DVT Prophylaxis:v    Advanced Directives:  Discussed with:    Visit Information:    ** Time is exclusive of billed procedures and/or teaching and/or routine family updates.

## 2024-12-31 NOTE — H&P ADULT - HISTORY OF PRESENT ILLNESS
54-year-old female with history of SVT in the past Which she states was several years ago, hypothyroidism presents for sudden onset of palpitations described as heart racing that began at 8 PM last night.  Patient states that this occurred at rest.  Patient notes that her "heart is pounding" but denies any pressure or pain.  Patient denies any nausea or vomiting.  Patient has had a cough for the last few days and tested positive for RSV.  Patient was concerned that cough medicine she was taking which may have  contain phenylephrine might lead to the  palpitations.  Patient was found to be in SVT with a heart rate of 178.  Attempted to convert to sinus rhythm using vagal maneuvers at bedside which were unsuccessful.  Will get cardiac workup and administer adenosine starting with a dose of 6 mg and if unsuccessful will give a dose of 12 mg IV.  54-year-old female with history of SVT in the past Which she states was several years ago, hypothyroidism presents for sudden onset of palpitations described as heart racing that began at 8 PM last night.  Patient states that this occurred at rest.  Patient notes that her "heart is pounding" but denies any pressure or pain.  Patient denies any nausea or vomiting.  Patient has had a cough for the last few days and tested positive for RSV.  Patient was concerned that cough medicine she was taking which may have  contain phenylephrine might lead to the  palpitations.  Patient was found to be in SVT with a heart rate of 178.  Attempted to convert to sinus rhythm using vagal maneuvers at bedside which were unsuccessful.  Will get cardiac workup and administer adenosine starting with a dose of 6 mg and if unsuccessful will give a dose of 12 mg IV.    Patient had another run of SVT in ED which broke with dose of adenosine.  Patient states that she had similar episode and symptoms 4 years prior, but did not have follow up as patient's mother passed during the same time.  Was prescribed metoprolol previously to be taken as needed if having palpitations. Today is patient's 25 year wedding anniversary.   54-year-old female with history of SVT in the past Which she states was several years ago, hypothyroidism presents for sudden onset of palpitations described as heart racing that began at 8 PM last night.  Patient states that this occurred at rest.  Patient notes that her "heart is pounding" but denies any pressure or pain.  Patient denies any nausea or vomiting.  Patient has had a cough for the last few days and tested positive for RSV.  Patient was concerned that cough medicine she was taking which may have  contain phenylephrine might lead to the  palpitations.  Patient was found to be in SVT with a heart rate of 178.  Attempted to convert to sinus rhythm using vagal maneuvers at bedside which were unsuccessful.  Will get cardiac workup and administer adenosine starting with a dose of 6 mg and if unsuccessful will give a dose of 12 mg IV.    Patient had another run of SVT in ED and RRT was called, SVT broke with dose of 6 mg IVP adenosine.  Patient states that she had similar episode and symptoms 4 years prior, but did not have follow up as patient's mother passed during the same time.  Was prescribed metoprolol previously to be taken as needed if having palpitations. Today is patient's 25 year wedding anniversary.

## 2024-12-31 NOTE — PATIENT PROFILE ADULT - LEGAL HELP
Return to the ER with any new or worsening of symptoms such as but not limited to increased pain, fevers, inability to tolerate food or liquids, chest pain, shortness of breath, dizziness, passing out, persistent headaches, vomiting, or any other concerning symptoms  Take antibiotics as prescribed to completion  Thank you for allowing us to be part of your care today  no

## 2024-12-31 NOTE — H&P ADULT - NSHPPHYSICALEXAM_GEN_ALL_CORE
Vital Signs Last 24 Hrs  T(C): 36.7 (31 Dec 2024 16:17), Max: 36.7 (31 Dec 2024 04:00)  T(F): 98 (31 Dec 2024 16:17), Max: 98.1 (31 Dec 2024 04:00)  HR: 71 (31 Dec 2024 16:17) (67 - 178)  BP: 125/92 (31 Dec 2024 16:17) (77/59 - 125/92)  BP(mean): 101 (31 Dec 2024 16:17) (66 - 101)  RR: 18 (31 Dec 2024 16:17) (16 - 18)  SpO2: 100% (31 Dec 2024 16:17) (94% - 100%)    Parameters below as of 31 Dec 2024 16:17  Patient On (Oxygen Delivery Method): room air

## 2024-12-31 NOTE — ED PROVIDER NOTE - NSCAREINITIATED _GEN_ER
SVN to 4 x day continue Flutter  and Inhalers    Problem: Bronchoconstriction  Goal: Improve in air movement and diminished wheezing  Description: Target End Date:  1-2days    1.  Implement inhaled treatments  2.  Evaluate and manage medication effects  Outcome: Progressing     Problem: Bronchopulmonary Hygiene  Goal: Increase mobilization of retained secretions  Description: Target End Date:  2 to 3 days    1.  Perform bronchopulmonary therapy as indicated by assessment  2.  Perform airway suctioning  3.  Perform actions to maintain patient airway  Outcome: Progressing      Cooper Alas(Attending)

## 2024-12-31 NOTE — ED PROVIDER NOTE - CLINICAL SUMMARY MEDICAL DECISION MAKING FREE TEXT BOX
54-year-old female with history of SVT in the past Which she states was several years ago, hypothyroidism presents for sudden onset of palpitations described as heart racing that began at 8 PM last night.  Patient states that this occurred at rest.  Patient notes that her "heart is pounding" but denies any pressure or pain.  Patient denies any nausea or vomiting.  Patient has had a cough for the last few days and tested positive for RSV.  Patient was concerned that cough medicine she was taking which may have  contain phenylephrine might lead to the  palpitations.  Patient was found to be in SVT with a heart rate of 178.  Attempted to convert to sinus rhythm using vagal maneuvers at bedside which were unsuccessful.  Will get cardiac workup and administer adenosine starting with a dose of 6 mg and if unsuccessful will give a dose of 12 mg IV.    See progress note

## 2024-12-31 NOTE — ED PROVIDER NOTE - PROGRESS NOTE DETAILS
Case discussed with hospitalist Dr. Winters Who request D-dimer, BNP, repeat troponin prior to admission.  If D-dimer is elevated, will obtain CTA to rule out PE.  Cooper Alas, DO

## 2024-12-31 NOTE — PATIENT PROFILE ADULT - FALL HARM RISK - HARM RISK INTERVENTIONS
normal... Assistance with ambulation/Assistance OOB with selected safe patient handling equipment/Communicate Risk of Fall with Harm to all staff/Use of alarms - bed, chair and/or voice tab/Visual Cue: Yellow wristband and red socks/Bed in lowest position, wheels locked, appropriate side rails in place/Call bell, personal items and telephone in reach/Instruct patient to call for assistance before getting out of bed or chair/Non-slip footwear when patient is out of bed/[place] to call system/Physically safe environment - no spills, clutter or unnecessary equipment/Purposeful Proactive Rounding/Room/bathroom lighting operational, light cord in reach

## 2024-12-31 NOTE — CONSULT NOTE ADULT - SUBJECTIVE AND OBJECTIVE BOX
EConsult Endocrinology    54-year-old female with history of SVT hypothyroidism presented for sudden onset of palpitations described as heart racing that began at 8 PM last night.  Patient states that this occurred at rest.    Patient has had a cough for the last few days and tested positive for RSV and had been takinging some OTC decongestants.  Patient was found to be in SVT with a heart rate of 178.  Pt had cardioversion with Adenosine  x 2     Pt found to have TSH 52 with free T4 0.4     PAST MEDICAL & SURGICAL HISTORY:  Hypothyroidism  - in 2020 when pt presented in SVT to  ER  July 2020   TSH 46  free T4 0.6    Pt was reportedly off her Levothyroxine  for about 1 week- no dose available in chart notes that were available       Fixation hardware in leg          FAMILY HISTORY:  FH: arrhythmia (Mother    MEDICATIONS  (STANDING):  levothyroxine 50 MICROGram(s) Oral once  metoprolol tartrate 25 milliGRAM(s) Oral two times a day    MEDICATIONS  (PRN):  acetaminophen     Tablet .. 650 milliGRAM(s) Oral every 6 hours PRN Mild Pain (1 - 3)  ondansetron Injectable 4 milliGRAM(s) IV Push every 6 hours PRN Nausea and/or Vomiting      Allergies    No Known Allergies    Intolerances              PHYSICAL EXAM:    Vital Signs Last 24 Hrs  T(C): 36.7 (31 Dec 2024 16:17), Max: 36.7 (31 Dec 2024 04:00)  T(F): 98 (31 Dec 2024 16:17), Max: 98.1 (31 Dec 2024 04:00)  HR: 71 (31 Dec 2024 16:17) (67 - 178)  BP: 125/92 (31 Dec 2024 16:17) (77/59 - 125/92)  BP(mean): 101 (31 Dec 2024 16:17) (66 - 101)  RR: 18 (31 Dec 2024 16:17) (16 - 18)  SpO2: 100% (31 Dec 2024 16:17) (94% - 100%)    Parameters below as of 31 Dec 2024 16:17  Patient On (Oxygen Delivery Method): room air            LABS:                        11.7   8.83  )-----------( 225      ( 31 Dec 2024 03:56 )             36.4     12-31    135  |  106  |  22  ----------------------------<  133[H]  3.8   |  24  |  1.26    Ca    8.6      31 Dec 2024 03:56  Mg     2.2     12-31    TPro  8.4[H]  /  Alb  3.1[L]  /  TBili  0.2  /  DBili  x   /  AST  21  /  ALT  18  /  AlkPhos  76  12-31    Urinalysis Basic - ( 31 Dec 2024 03:56 )    Color: x / Appearance: x / SG: x / pH: x  Gluc: 133 mg/dL / Ketone: x  / Bili: x / Urobili: x   Blood: x / Protein: x / Nitrite: x   Leuk Esterase: x / RBC: x / WBC x   Sq Epi: x / Non Sq Epi: x / Bacteria: x      LIVER FUNCTIONS - ( 31 Dec 2024 03:56 )  Alb: 3.1 g/dL / Pro: 8.4 gm/dL / ALK PHOS: 76 U/L / ALT: 18 U/L / AST: 21 U/L / GGT: x             Thyroid Stimulating Hormone, Serum (12.31.24 @ 06:26)   Thyroid Stimulating Hormone, Serum: 52.80 uU/mL  Thyroid Stimulating Hormone, Serum (07.21.20 @ 15:51)   Thyroid Stimulating Hormone, Serum: 46.30 uU/mLFree Thyroxine, Serum (12.31.24 @ 06:26)   Free Thyroxine, Serum: 0.40 ng/dL  Free Thyroxine, Serum (07.21.20 @ 15:51)   Free Thyroxine, Serum: 0.60 ng/dL    Creatine Kinase (12.31.24 @ 06:26)   Creatine Kinase: 140 U/LTroponin I, High Sensitivity (12.31.24 @ 06:26)   Troponin I, High Sensitivity Result: 297.93: Serial measurements of high sensitivity troponin I (hs TnI) showing rapid   upward or downward changes suggest acute myocardial injury. Please note   that a sustained elevation of hsTnI can be caused by renal disease,   chronic heart failure, sepsis, pulmonary embolism and other clinical   conditions.   High Sensitivity Troponin and New Male & Female Reference ranges in   effect as of 10/05/2021.   Female reference range < 53.7 ng/L   Male reference range <78.5 ng/L ng/L  Troponin I, High Sensitivity (12.31.24 @ 03:56)   Troponin I, High Sensitivity Result: 151.98: Serial measurements of high sensitivity troponin I (hs TnI) showing rapid   upward or downward changes suggest acute myocardial injury. Please note   that a sustained elevation of hsTnI can be caused by renal disease,   chronic heart failure, sepsis, pulmonary embolism and other clinical   conditions.   High Sensitivity Troponin and New Male & Female Reference ranges in   effect as of 10/05/2021.   Female reference range < 53.7 ng/L   Male reference range <78.5 ng/L ng/L:    
Patient is a 54y old  Female who presents with a chief complaint of palpitations    HPI:   54-year-old female with history of SVT in the past Which she states was several years ago, hypothyroidism presents for sudden onset of palpitations described as heart racing that began at 8 PM last night.  Patient states that this occurred at rest.  Patient notes that her "heart is pounding" but denies any pressure or pain.  Patient denies any nausea or vomiting.  Patient has had a cough for the last few days and tested positive for RSV.  Patient was concerned that cough medicine she was taking which may have  contain phenylephrine might lead to the  palpitations.  Patient was found to be in SVT with a heart rate of 178.  Attempted to convert to sinus rhythm using vagal maneuvers at bedside which were unsuccessful.  Will get cardiac workup and administer adenosine starting with a dose of 6 mg and if unsuccessful will give a dose of 12 mg IV. (31 Dec 2024 07:52)      PAST MEDICAL & SURGICAL HISTORY:  Hypothyroidism            Allergies    No Known Allergies    Intolerances        MEDICATIONS  (STANDING):  metoprolol tartrate 25 milliGRAM(s) Oral two times a day    MEDICATIONS  (PRN):  acetaminophen     Tablet .. 650 milliGRAM(s) Oral every 6 hours PRN Mild Pain (1 - 3)  ondansetron Injectable 4 milliGRAM(s) IV Push every 6 hours PRN Nausea and/or Vomiting      FAMILY HISTORY:  Mother, death due to arrhythmia age 70's    SOCIAL HISTORY  Tobacco use: denies      REVIEW OF SYSTEMS:  Unremarkable except as described in HPI, also symptoms related to virus infection    Vital Signs Last 24 Hrs  T(C): 36.7 (31 Dec 2024 11:56), Max: 36.7 (31 Dec 2024 04:00)  T(F): 98 (31 Dec 2024 11:56), Max: 98.1 (31 Dec 2024 04:00)  HR: 72 (31 Dec 2024 11:56) (67 - 178)  BP: 106/82 (31 Dec 2024 11:56) (77/59 - 106/82)  BP(mean): 91 (31 Dec 2024 11:56) (66 - 91)  RR: 17 (31 Dec 2024 11:56) (16 - 18)  SpO2: 99% (31 Dec 2024 11:56) (94% - 100%)    Parameters below as of 31 Dec 2024 11:56  Patient On (Oxygen Delivery Method): room air        Daily Height in cm: 170.18 (31 Dec 2024 03:20)    Daily     I&O's Detail      PHYSICAL EXAM:  Appearance: No distress  HEENT:   Normal oral mucosa  Cardiovascular: Normal S1 S2, No JVD, No cardiac murmurs, No carotid bruits, No peripheral edema  Respiratory: Lungs clear to auscultation	  Psychiatry: A & O x 3, Mood & affect appropriate  Gastrointestinal:  Soft, Non-tender, + BS, no bruits	  Skin: No rashes, No ecchymoses, No cyanosis  Neurologic: Grossly non-focal with full strength in all four extremities  Extremities: Normal range of motion, No clubbing, cyanosis or edema  Vascular: Peripheral pulses palpable 2+ bilaterally          ECG: Sinus rhythm. Initial ECG: SVT    LABS:                        11.7   8.83  )-----------( 225      ( 31 Dec 2024 03:56 )             36.4     12-31    135  |  106  |  22  ----------------------------<  133[H]  3.8   |  24  |  1.26    Ca    8.6      31 Dec 2024 03:56  Mg     2.2     12-31    TPro  8.4[H]  /  Alb  3.1[L]  /  TBili  0.2  /  DBili  x   /  AST  21  /  ALT  18  /  AlkPhos  76  12-31    PT/INR - ( 31 Dec 2024 06:34 )   PT: 12.3 sec;   INR: 1.07 ratio         PTT - ( 31 Dec 2024 06:34 )  PTT:29.8 sec  Thyroid Stimulating Hormone, Serum: 52.80 uU/mL *H* (12-31-24 @ 06:26)    Troponin I, High Sensitivity Result: 297.93 ng/L *H* (12-31-24 @ 06:26)  Pro-Brain Natriuretic Peptide: 786 pg/mL *H* (12-31-24 @ 06:26)  Troponin I, High Sensitivity Result: 151.98 ng/L *H* (12-31-24 @ 03:56)        Admitting attending: Cl Wilkinson  Outpatient provider:

## 2024-12-31 NOTE — PROGRESS NOTE ADULT - ASSESSMENT
A/P: 54 female With a SVT in the 150s    Plan:  EKG dome and SVT in the 150s  No CP or SOB  Felt her heart racing    I pushed Adenosine 6mg IVP x 1  Broke to NSR in the 80s  Suggested to staret Metoprolol  Cardio to see the patient  Ok for Tele  Please call back if her condition changes

## 2024-12-31 NOTE — ED ADULT NURSE REASSESSMENT NOTE - NS ED NURSE REASSESS COMMENT FT1
Break RN alerted to PT HR in 160's-170's stat ekg performed rapid response called. Hospitalist and ICU doctor at bedside requesting 6 mg of adenosine. MD Nickerson pushed 6 mg of adenosine 13:43 Pt HR lowered to 70's- 80's.
Report received from KAREN Forte. Pt resting comfortably in bed, denies any pain/ symptoms at this time, vital signs stable, awaiting admission.

## 2024-12-31 NOTE — ED PROVIDER NOTE - CARE PLAN
Principal Discharge DX:	SVT (supraventricular tachycardia)  Secondary Diagnosis:	Elevated troponin level   1

## 2024-12-31 NOTE — CONSULT NOTE ADULT - PROBLEM SELECTOR RECOMMENDATION 9
S/p Adenosine in ED leading to termination of SVT.   Start lopressor 25mg PO BID.  Case d/w Dr. Wilkinson

## 2024-12-31 NOTE — H&P ADULT - ASSESSMENT
54-year-old female with history of SVT in the past Which she states was several years ago, hypothyroidism presents for sudden onset of palpitations described as heart racing that began at 8 PM last night admitted for SVT and Hypothyroidism    #SVT  - s/p adenosine in ED, now in sinus rhythm  - Cardiology Consult  - Had an RRT and a second dose of adenosine given  - Metoprolol tartrate 25 mg po BID    #Elevated trops  - likely secondary to SVT  -follow up 2-d echo  -NST 1/2/25    #Hypothyroidism  - Synthroid 50 mcg po today and 1/1/25, with increased to 75 mcg po daily after 2 days  - TSH, FT4 daily for several days  - Thyroid abs  - Endocrine Telehealth consult    #DVT prophylaxis  - Lovenox 40 mg sq daily    Total time spent: 65 minutes   54-year-old female with history of SVT in the past Which she states was several years ago, hypothyroidism presents for sudden onset of palpitations described as heart racing that began at 8 PM last night admitted for SVT and Hypothyroidism    #SVT  - s/p adenosine in ED, now in sinus rhythm  - Cardiology Consult: Case discussed with Dr. Hamilton appreciated  - Had an RRT and a second dose of adenosine given  - Metoprolol tartrate 25 mg po BID    #Elevated trops  - likely secondary to SVT  -follow up 2-d echo  -NST 1/2/25    #Hypothyroidism  - Synthroid 50 mcg po today and 1/1/25, with increased to 75 mcg po daily after 2 days  - TSH, FT4 daily for several days  - Thyroid abs  - Endocrine Telehealth consult: Case discussed with Dr. Dr. Nakia Rios    #DVT prophylaxis  - Lovenox 40 mg sq daily    Total time spent: 65 minutes   54-year-old female with history of SVT in the past Which she states was several years ago, hypothyroidism presents for sudden onset of palpitations described as heart racing that began at 8 PM last night admitted for SVT and Hypothyroidism    #SVT  - s/p adenosine in ED, now in sinus rhythm  - Cardiology Consult: Case discussed with Dr. Alfonso penn  - Had an RRT and a second dose of adenosine given  - Metoprolol tartrate 25 mg po BID    #Elevated trops  - likely secondary to SVT  -follow up 2-d echo  -NST 1/2/25    #Hypothyroidism  - Synthroid 50 mcg po today and 1/1/25, with increased to 75 mcg po daily after 2 days  - TSH, FT4 daily for several days  - Thyroid abs  - Endocrine Telehealth consult: Case discussed with Dr. Dr. Nakia Rios    #RSV infection  - Supportive care  - s/p IVF bolus 1.5 L NS total in ED    #DVT prophylaxis  - Lovenox 40 mg sq daily    Total time spent: 65 minutes

## 2024-12-31 NOTE — CONSULT NOTE ADULT - PROBLEM SELECTOR RECOMMENDATION 2
Most likely secondary to SVT.   No symptoms suggestive of ACS.   Ordered TTE  NST Thursday  FIndings and plan discussed with pt.

## 2024-12-31 NOTE — PROVIDER CONTACT NOTE (CRITICAL VALUE NOTIFICATION) - DATE AND TIME:
31-Dec-2024 04:57
General Sunscreen Counseling: I recommended a broad spectrum sunscreen with a SPF of 50 or higher.  I explained that SPF 30 sunscreens block approximately 97 percent of the sun's harmful rays.  Sunscreens should be applied at least 15 minutes prior to expected sun exposure and then every 2 hours after that as long as sun exposure continues. If swimming or exercising sunscreen should be reapplied every 45 minutes to an hour after getting wet or sweating.  One ounce, or the equivalent of a shot glass full of sunscreen, is adequate to protect the skin not covered by a bathing suit. I also recommended a lip balm with a sunscreen as well. Sun protective clothing can be used in lieu of sunscreen but must be worn the entire time you are exposed to the sun's rays. ABCDEs/monthly SSE
Detail Level: Generalized
General Sunscreen Counseling: I recommended a broad spectrum sunscreen with a SPF of 50 or higher.  I explained that SPF 30 sunscreens block approximately 97 percent of the sun's harmful rays.  Sunscreens should be applied at least 15 minutes prior to expected sun exposure and then every 2 hours after that as long as sun exposure continues. If swimming or exercising sunscreen should be reapplied every 45 minutes to an hour after getting wet or sweating.  One ounce, or the equivalent of a shot glass full of sunscreen, is adequate to protect the skin not covered by a bathing suit. I also recommended a lip balm with a sunscreen as well. Sun protective clothing can be used in lieu of sunscreen but must be worn the entire time you are exposed to the sun's rays. ABCDEs/monthly SSE

## 2025-01-01 LAB
ANION GAP SERPL CALC-SCNC: 3 MMOL/L — LOW (ref 5–17)
BUN SERPL-MCNC: 10 MG/DL — SIGNIFICANT CHANGE UP (ref 7–23)
CALCIUM SERPL-MCNC: 8.6 MG/DL — SIGNIFICANT CHANGE UP (ref 8.5–10.1)
CHLORIDE SERPL-SCNC: 108 MMOL/L — SIGNIFICANT CHANGE UP (ref 96–108)
CO2 SERPL-SCNC: 26 MMOL/L — SIGNIFICANT CHANGE UP (ref 22–31)
CREAT SERPL-MCNC: 0.76 MG/DL — SIGNIFICANT CHANGE UP (ref 0.5–1.3)
EGFR: 92 ML/MIN/1.73M2 — SIGNIFICANT CHANGE UP
GLUCOSE SERPL-MCNC: 99 MG/DL — SIGNIFICANT CHANGE UP (ref 70–99)
HCT VFR BLD CALC: 34.5 % — SIGNIFICANT CHANGE UP (ref 34.5–45)
HGB BLD-MCNC: 11 G/DL — LOW (ref 11.5–15.5)
MAGNESIUM SERPL-MCNC: 2.3 MG/DL — SIGNIFICANT CHANGE UP (ref 1.6–2.6)
MCHC RBC-ENTMCNC: 27 PG — SIGNIFICANT CHANGE UP (ref 27–34)
MCHC RBC-ENTMCNC: 31.9 G/DL — LOW (ref 32–36)
MCV RBC AUTO: 84.8 FL — SIGNIFICANT CHANGE UP (ref 80–100)
PHOSPHATE SERPL-MCNC: 3.2 MG/DL — SIGNIFICANT CHANGE UP (ref 2.5–4.5)
PLATELET # BLD AUTO: 189 K/UL — SIGNIFICANT CHANGE UP (ref 150–400)
POTASSIUM SERPL-MCNC: 3.9 MMOL/L — SIGNIFICANT CHANGE UP (ref 3.5–5.3)
POTASSIUM SERPL-SCNC: 3.9 MMOL/L — SIGNIFICANT CHANGE UP (ref 3.5–5.3)
RBC # BLD: 4.07 M/UL — SIGNIFICANT CHANGE UP (ref 3.8–5.2)
RBC # FLD: 15.1 % — HIGH (ref 10.3–14.5)
SODIUM SERPL-SCNC: 137 MMOL/L — SIGNIFICANT CHANGE UP (ref 135–145)
T4 FREE SERPL-MCNC: 0.48 NG/DL — LOW (ref 0.93–1.7)
THYROGLOB AB FLD IA-ACNC: 55.2 IU/ML — SIGNIFICANT CHANGE UP
THYROGLOB AB SERPL-ACNC: 55.2 IU/ML — SIGNIFICANT CHANGE UP
THYROGLOB SERPL-MCNC: 8.42 NG/ML — SIGNIFICANT CHANGE UP (ref 0.9–77.3)
THYROPEROXIDASE AB SERPL-ACNC: 252 IU/ML — HIGH
TSH SERPL-MCNC: 61.6 UU/ML — HIGH (ref 0.34–4.82)
WBC # BLD: 6.89 K/UL — SIGNIFICANT CHANGE UP (ref 3.8–10.5)
WBC # FLD AUTO: 6.89 K/UL — SIGNIFICANT CHANGE UP (ref 3.8–10.5)

## 2025-01-01 PROCEDURE — 93010 ELECTROCARDIOGRAM REPORT: CPT

## 2025-01-01 PROCEDURE — 99233 SBSQ HOSP IP/OBS HIGH 50: CPT

## 2025-01-01 RX ADMIN — ACETAMINOPHEN 650 MILLIGRAM(S): 80 SOLUTION/ DROPS ORAL at 10:58

## 2025-01-01 RX ADMIN — Medication 25 MILLIGRAM(S): at 09:57

## 2025-01-01 RX ADMIN — ENOXAPARIN SODIUM 40 MILLIGRAM(S): 60 INJECTION INTRAVENOUS; SUBCUTANEOUS at 06:09

## 2025-01-01 RX ADMIN — Medication 25 MILLIGRAM(S): at 21:18

## 2025-01-01 RX ADMIN — LEVOTHYROXINE SODIUM 50 MICROGRAM(S): 175 TABLET ORAL at 06:09

## 2025-01-01 RX ADMIN — ACETAMINOPHEN 650 MILLIGRAM(S): 80 SOLUTION/ DROPS ORAL at 09:58

## 2025-01-01 NOTE — PROVIDER CONTACT NOTE (CHANGE IN STATUS NOTIFICATION) - ACTION/TREATMENT ORDERED:
EKG at 7  Magnesium and phosphate levels ordered with morning labs  Metoprolol parameters changed: hold if HR less than 60

## 2025-01-01 NOTE — PROGRESS NOTE ADULT - ASSESSMENT
54-year-old female with history of SVT in the past Which she states was several years ago, hypothyroidism presents for sudden onset of palpitations described as heart racing that began at 8 PM last night admitted for SVT and Hypothyroidism    #SVT  - s/p adenosine in ED, now in sinus rhythm  - Cardiology Consult: Case discussed with Dr. Alfonso penn  - Had an RRT and a second dose of adenosine given  - Metoprolol tartrate 25 mg po BID    #Elevated trops  - likely secondary to SVT  -follow up 2-d echo  -NST 1/2/25    #Hypothyroidism  - Synthroid 50 mcg po today and 1/1/25, with increased to 75 mcg po daily after 2 days  - TSH, FT4 daily for several days  - Thyroid abs  - Endocrine Telehealth consult: Case discussed with Dr. Dr. Nakia Rios    #RSV infection  - Supportive care  - s/p IVF bolus 1.5 L NS total in ED    #DVT prophylaxis  - Lovenox 40 mg sq daily     54-year-old female with history of SVT in the past Which she states was several years ago, hypothyroidism presents for sudden onset of palpitations described as heart racing that began at 8 PM last night admitted for SVT and Hypothyroidism    #SVT  - s/p adenosine in ED, now in sinus rhythm  - Cardiology Consult: Case discussed with Dr. Alfonso penn  - Had an RRT and a second dose of adenosine given  - Metoprolol tartrate 25 mg po BID continue, HR 50-60's  - Advised to avoid phenylephrine in decongestants    #Elevated trops  - likely secondary to SVT  -follow up 2-d echo  -NST 1/2/25    #Hypothyroidism  - Synthroid 50 mcg po today and 1/1/25, with increased to 75 mcg po daily after 2 days  - TSH, FT4 daily for several days  - Thyroid abs  - Endocrine Telehealth consult: Case discussed with Dr. Dr. Nakia Rios    #RSV infection  - Supportive care  - s/p IVF bolus 1.5 L NS total in ED    #DVT prophylaxis  - Lovenox 40 mg sq daily

## 2025-01-01 NOTE — PROGRESS NOTE ADULT - SUBJECTIVE AND OBJECTIVE BOX
PCP:    REQUESTING PHYSICIAN:    REASON FOR CONSULT:    CHIEF COMPLAINT:    HPI:   54-year-old female with history of SVT in the past Which she states was several years ago, hypothyroidism presents for sudden onset of palpitations described as heart racing that began at 8 PM last night.  Patient states that this occurred at rest.  Patient notes that her "heart is pounding" but denies any pressure or pain.  Patient denies any nausea or vomiting.  Patient has had a cough for the last few days and tested positive for RSV.  Patient was concerned that cough medicine she was taking which may have  contain phenylephrine might lead to the  palpitations.  Patient was found to be in SVT with a heart rate of 178.  Attempted to convert to sinus rhythm using vagal maneuvers at bedside which were unsuccessful.  Will get cardiac workup and administer adenosine starting with a dose of 6 mg and if unsuccessful will give a dose of 12 mg IV. (31 Dec 2024 07:52)  25: Feels improved. No further palpitations. NSR without arrhythmia    PAST MEDICAL & SURGICAL HISTORY:  Hypothyroidism      Fixation hardware in leg          SOCIAL HISTORY:    FAMILY HISTORY:  FH: arrhythmia (Mother)        ALLERGIES:  Allergies    No Known Allergies    Intolerances        MEDICATIONS:    MEDICATIONS  (STANDING):  enoxaparin Injectable 40 milliGRAM(s) SubCutaneous every 24 hours  levothyroxine 50 MICROGram(s) Oral daily  metoprolol tartrate 25 milliGRAM(s) Oral two times a day    MEDICATIONS  (PRN):  acetaminophen     Tablet .. 650 milliGRAM(s) Oral every 6 hours PRN Mild Pain (1 - 3)  aluminum hydroxide/magnesium hydroxide/simethicone Suspension 30 milliLiter(s) Oral every 4 hours PRN Dyspepsia  melatonin 3 milliGRAM(s) Oral at bedtime PRN Insomnia  ondansetron Injectable 4 milliGRAM(s) IV Push every 6 hours PRN Nausea and/or Vomiting        Vital Signs Last 24 Hrs  T(C): 36.6 (2025 08:35), Max: 36.7 (31 Dec 2024 16:17)  T(F): 97.8 (2025 08:35), Max: 98.1 (31 Dec 2024 18:53)  HR: 65 (2025 08:35) (59 - 89)  BP: 130/93 (2025 08:35) (106/81 - 144/92)  BP(mean): 94 (2025 01:45) (85 - 101)  RR: 18 (2025 08:35) (16 - 18)  SpO2: 100% (2025 08:35) (97% - 100%)    Parameters below as of 2025 08:35  Patient On (Oxygen Delivery Method): room air    Daily     Daily Weight in k.3 (31 Dec 2024 18:53)I&O's Summary      PHYSICAL EXAM:    Constitutional: NAD, awake and alert, well-developed  HEENT: PERR, EOMI,  No oral cyananosis.  Neck:  supple,  No JVD  Respiratory: Breath sounds are clear bilaterally, No wheezing, rales or rhonchi  Cardiovascular: S1 and S2, regular rate and rhythm, no Murmurs, gallops or rubs  Gastrointestinal: Bowel Sounds present, soft, nontender.   Extremities: No peripheral edema. No clubbing or cyanosis.  Vascular: 2+ peripheral pulses  Neurological: A/O x 3, no focal deficits  Musculoskeletal: no calf tenderness.  Skin: No rashes.      LABS: All Labs Reviewed:                        11.0   6.89  )-----------( 189      ( 2025 07:01 )             34.5                         11.7   8.83  )-----------( 225      ( 31 Dec 2024 03:56 )             36.4     2025 07:01    137    |  108    |  10     ----------------------------<  99     3.9     |  26     |  0.76   31 Dec 2024 03:56    135    |  106    |  22     ----------------------------<  133    3.8     |  24     |  1.26     Ca    8.6        2025 07:01  Ca    8.6        31 Dec 2024 03:56  Phos  3.2       2025 07:01  Mg     2.3       2025 07:01  Mg     2.2       31 Dec 2024 03:56    TPro  8.4    /  Alb  3.1    /  TBili  0.2    /  DBili  x      /  AST  21     /  ALT  18     /  AlkPhos  76     31 Dec 2024 03:56    PT/INR - ( 31 Dec 2024 06:34 )   PT: 12.3 sec;   INR: 1.07 ratio         PTT - ( 31 Dec 2024 06:34 )  PTT:29.8 sec      Blood Culture:      @ 07:01  TSH: 61.60   @ 06:26  TSH: 52.80      RADIOLOGY/EKG:  < from: 12 Lead ECG (20 @ 15:46) >  Diagnosis Line Sinus tachycardia  Minimal voltage criteria for LVH, may be normal variant  Septal infarct , age undetermined  ST & T wave abnormality, consider lateral ischemia  Abnormal ECG  No previous ECGs available  Confirmed by CHEPE AMARAL MD ([id number]) on 2020 10:26:34 AM    < end of copied text >      ECHO/CARDIAC CATHTERIZATION/STRESS TEST:

## 2025-01-01 NOTE — PROGRESS NOTE ADULT - SUBJECTIVE AND OBJECTIVE BOX
HOSPITALIST ATTENDING PROGRESS NOTE    Chart and meds reviewed.  Patient seen and examined.    CC/ HPI Patient is a 54y old  Female who presents with a chief complaint of SOB (01 Jan 2025 13:58)      Subjective: patient resting comfortably in bed. no further palpitations or sob.     All other systems reviewed and found to be negative with the exception of what has been described above.    MEDICATIONS:  MEDICATIONS  (STANDING):  enoxaparin Injectable 40 milliGRAM(s) SubCutaneous every 24 hours  levothyroxine 50 MICROGram(s) Oral daily  metoprolol tartrate 25 milliGRAM(s) Oral two times a day      Vital Signs Last 24 Hrs  T(C): 36.2 (01 Jan 2025 16:00), Max: 36.6 (01 Jan 2025 08:35)  T(F): 97.1 (01 Jan 2025 16:00), Max: 97.8 (01 Jan 2025 08:35)  HR: 62 (01 Jan 2025 16:00) (59 - 70)  BP: 136/87 (01 Jan 2025 16:00) (106/81 - 139/79)  BP(mean): 94 (01 Jan 2025 01:45) (85 - 94)  RR: 18 (01 Jan 2025 16:00) (18 - 18)  SpO2: 97% (01 Jan 2025 16:00) (97% - 100%)    Parameters below as of 01 Jan 2025 16:00  Patient On (Oxygen Delivery Method): room air        I&O's Summary      CAPILLARY BLOOD GLUCOSE          PHYSICAL EXAM:    Constitutional: NAD, awake and alert, well-developed  HEENT:  EOMI, Normal Hearing, MMM  Neck: Soft and supple, No LAD, No JVD  Respiratory: Breath sounds are clear bilaterally, No wheezing, rales or rhonchi  Cardiovascular: S1 and S2, regular rate and rhythm, no Murmurs, gallops or rubs  Gastrointestinal: Bowel Sounds present, soft, nontender, nondistended, no guarding, no rebound  Extremities: No peripheral edema  Vascular: 2+ peripheral pulses  Neurological: A/O x 3, no focal deficits  Musculoskeletal: 5/5 strength b/l upper and lower extremities  Skin: No rashes        LABS: All Labs Reviewed:                        11.0   6.89  )-----------( 189      ( 01 Jan 2025 07:01 )             34.5     01-01    137  |  108  |  10  ----------------------------<  99  3.9   |  26  |  0.76    Ca    8.6      01 Jan 2025 07:01  Phos  3.2     01-01  Mg     2.3     01-01    TPro  8.4[H]  /  Alb  3.1[L]  /  TBili  0.2  /  DBili  x   /  AST  21  /  ALT  18  /  AlkPhos  76  12-31    PT/INR - ( 31 Dec 2024 06:34 )   PT: 12.3 sec;   INR: 1.07 ratio         PTT - ( 31 Dec 2024 06:34 )  PTT:29.8 sec      Blood Culture:     RADIOLOGY/EKG:    DVT PPX:    ADVANCED DIRECTIVE:    DISPOSITION: DC planning in 24- 48 hours after stress on 1/2/25    Total time spent:  50 minutes

## 2025-01-01 NOTE — PROGRESS NOTE ADULT - PROBLEM SELECTOR PLAN 1
No recurrence. Pt should avoid phenylephrine. Pt will follow up as opt for further monitoring as necessary.

## 2025-01-01 NOTE — PROVIDER CONTACT NOTE (CHANGE IN STATUS NOTIFICATION) - BACKGROUND
Patient came for palpitations, found to have SVT with HR of 178. Got adenosine in ED and converted to NS.

## 2025-01-02 ENCOUNTER — RESULT REVIEW (OUTPATIENT)
Age: 55
End: 2025-01-02

## 2025-01-02 ENCOUNTER — TRANSCRIPTION ENCOUNTER (OUTPATIENT)
Age: 55
End: 2025-01-02

## 2025-01-02 LAB
T4 FREE SERPL-MCNC: 0.48 NG/DL — LOW (ref 0.93–1.7)
TSH SERPL-MCNC: 83.6 UU/ML — HIGH (ref 0.34–4.82)

## 2025-01-02 PROCEDURE — 99232 SBSQ HOSP IP/OBS MODERATE 35: CPT | Mod: 25

## 2025-01-02 PROCEDURE — 93306 TTE W/DOPPLER COMPLETE: CPT | Mod: 26

## 2025-01-02 PROCEDURE — 93018 CV STRESS TEST I&R ONLY: CPT

## 2025-01-02 PROCEDURE — 78452 HT MUSCLE IMAGE SPECT MULT: CPT | Mod: 26

## 2025-01-02 PROCEDURE — 99233 SBSQ HOSP IP/OBS HIGH 50: CPT

## 2025-01-02 PROCEDURE — 93016 CV STRESS TEST SUPVJ ONLY: CPT

## 2025-01-02 RX ORDER — CHLORHEXIDINE GLUCONATE 1.2 MG/ML
1 RINSE ORAL
Refills: 0 | Status: DISCONTINUED | OUTPATIENT
Start: 2025-01-02 | End: 2025-01-03

## 2025-01-02 RX ADMIN — ACETAMINOPHEN 650 MILLIGRAM(S): 80 SOLUTION/ DROPS ORAL at 13:48

## 2025-01-02 RX ADMIN — ENOXAPARIN SODIUM 40 MILLIGRAM(S): 60 INJECTION INTRAVENOUS; SUBCUTANEOUS at 06:27

## 2025-01-02 RX ADMIN — LEVOTHYROXINE SODIUM 50 MICROGRAM(S): 175 TABLET ORAL at 06:27

## 2025-01-02 RX ADMIN — Medication 25 MILLIGRAM(S): at 21:11

## 2025-01-02 NOTE — DISCHARGE NOTE PROVIDER - HOSPITAL COURSE
Patient is a 54 y.o. female who presented with palpitations with PMH SVT and hypothyroidism admitted for SVT and RSV. Had taken decongestants prior to feeling palpitations with similar episode 4 years prior.  Received adenosine in ED x 2 episodes which broke SVT and converted to sinus rhythm. Started on Metoprolol and Synthroid as TSH was elevated. Had elevated trops and started 2 phase nuclear stress test on 1/2/25 which showed ________XXXXXXXX______________ . Patient's TSH was elevated at 52 and increased to 61 and further to 83 with FT4 0.4 -> 0.48 and remaining at 0.48.   Plan is to take Synthroid 75 mcg po daily on 1/3/25 and 1/4/25 and then to increase to 88 mcg daily thereafter and to follow up via TeleHealth with Endocrine Dr. Nakia Rios.  Patient adivsed that TSH may have a lag by a couple of weeks prior to decreasing. \    NEED TO DETERMINE DOSE OF METOPROLOL BASED ON RECS FROM CARDIO PRIOR TO DC    On cardiac monitor patient has been sinus kenisha 40-60's and going to 30's while sleeping and brief sinus tach to 140's. Advised to avoid phenylephrine.     < from: TTE W or WO Ultrasound Enhancing Agent (01.02.25 @ 12:51) >    Left Ventricle:  The left ventricular cavity is normal in size. Left ventricular wall thickness is mildly increased. Left ventricular systolic function is normal with an ejection fraction visually estimated at 55 to 60%. Mild to moderate left ventricular hypertrophy. There is mild (grade 1) left ventricular diastolic dysfunction.       < end of copied text >         54 year-old woman with hypothyroidism, past SVT, who recently developed cough, malaise, RSV infection, presented to ED for palpitations that suddenly developed afer using a decongestant. In  the ED she was found to be in supraventricular tachycardia (SVT), which broke with adenosine (2 episodes). She remained in normal sinus rhythm and was triaged to Medicine for further evaluation and management.    RSV infection  No fever. No hypoxia. No sign of pneumonia or other complicating issues. Clinically improved with supportive care measures.     SVT  Resolved. Advised to avoid phenylephrine. Continue low dose metoprolol.    Elevated troponin  Likely myocardia demand related without infarction. TTE with normal heart size and systolic function. Stress testing negative for ischemia.     Hypothyroidism   Patient's TSH was elevated up to 83, FT4 0.4. Patient was treated with Levothyroxine, increased to 75mcg for a couple of days, and further increased to 88mcg upon discharge. Patient to follow up as outpatient via TeleHealth with Endocrinology Dr. Nakia Rios.       Discharge Exam:  Afebrile  /72  HR 68  RR 18  O2 100% on RA  Gen: Comfortable  HEENT: NCAT PERRL EOMI MMM clear oropharynx  Neck: Supple, no JVD, no LAD  CVS: s1 s2 normal, RRR  Chest: Normal resp effort, lungs CTA B/L  Abd: Non distended, +BS, soft, non tender  Ext: No edema, no tenderness, intact peripheral pulses, normal cap refill  Skin: Warm, dry  Mood: Calm, pleasant  Neuro: A+OX3, no gross deficits    Labs:            11.0  6.89 )------( 189           34.5    137  |  108  |  10  --------------------< 99  3.9   |   26   | 0.76    Ca 8.6    Tprot 8.4  /  Alb 3.1  /  Tbili 0.2  /  AST 21  /  ALT 18  /  AlkPhos 76    Troponin 152-->297   proBNP 876      Ddimer negative    TSH 84   FT4 0.4    TPO Ab and thyroglobulin Ab in process    COVID19, flu PCR negative, RSV PCR positive    Imaging:  CXR 12/31: No radiographic evidence of acute cardiopulmonary disease.    Cardiac Testing:  Stress MPI 1/3: Normal myocardial perfusion scan, with no evidence of infarction or inducible ischemia. Baseline electrocardiogram: sinus rhythm at a rate of 70 bpm. Stress electrocardiogram with no significant ischemic ST segment changes. Patient achieved 10.1 METS, which is consistent with good exercise capacity. The left ventricle is normal in function. The post stress left ventricular EF is 71 %. The stress end diastolic volume is 82 ml and systolic volume is 24 ml.    TTE 1/2: LV cavity normal in size, wall thickness mildly increased, systolic function normal. Mild to moderate LV hypertrophy. Mild LV diastolic dysfunction. Normal RV. Normal LA and RA. Intact septum. Mild MR. Mild TR. Normal PASP estimate. No pericardial effusion.      EKG 1/1: Rate 61. Normal sinus rhythm. Nonspecific ST and T wave abnormality

## 2025-01-02 NOTE — DISCHARGE NOTE PROVIDER - CARE PROVIDERS DIRECT ADDRESSES
,DirectAddress_Unknown,[email or web address] ,[email or web address],DirectAddress_Unknown

## 2025-01-02 NOTE — PROGRESS NOTE ADULT - PROBLEM SELECTOR PLAN 1
Presents with palpitations.  Pt with cough and +RSV, took cough medicine containing phenylephrine.  Found to have SVT with HR 170s in ER.  Denies cardiac hx.  -  tele reviewed SB-SR 40-60s, episode HR 30 while sleeping at 5am, brief episode SVT 140s this am; cont tele monitor  -  denies CV sxs; cont metoprolol with parameters; avoid phenylephrine

## 2025-01-02 NOTE — DISCHARGE NOTE PROVIDER - CARE PROVIDER_API CALL
Nakia Montelongo  Tele Endocrine  Phone: (   )    -  Fax: (   )    -  Follow Up Time: 1 week    Fidel Richardson [facility]  [address] [facility], [address] [place] [address]  Phone: [phone]  Fax: [phone]  Follow Up Time: 2 weeks   Fidel Richardson  [address], [address] [address]  Phone: [phone]  Fax: [phone]  Follow Up Time: 2 weeks    Nakia Montelongo  Endocrinology - Tele Health  Phone: [phone]  Fax: (   )    -  Follow Up Time: 1 week

## 2025-01-02 NOTE — DISCHARGE NOTE PROVIDER - ATTENDING DISCHARGE PHYSICAL EXAMINATION:
Vital Signs Last 24 Hrs  T(C): 36.4 (02 Jan 2025 20:13), Max: 36.6 (02 Jan 2025 08:25)  T(F): 97.5 (02 Jan 2025 20:13), Max: 97.8 (02 Jan 2025 08:25)  HR: 69 (02 Jan 2025 20:13) (60 - 70)  BP: 137/72 (02 Jan 2025 20:13) (120/75 - 139/77)  BP(mean): --  RR: 18 (02 Jan 2025 20:13) (18 - 18)  SpO2: 97% (02 Jan 2025 20:13) (96% - 98%)    Parameters below as of 02 Jan 2025 20:13  Patient On (Oxygen Delivery Method): room air    PHYSICAL EXAM:    Constitutional: NAD, awake and alert, well-developed  HEENT:  EOMI, Normal Hearing, MMM  Neck: Soft and supple, No LAD, No JVD  Respiratory: Breath sounds are clear bilaterally, No wheezing, rales or rhonchi  Cardiovascular: S1 and S2, regular rate and rhythm, no Murmurs, gallops or rubs  Gastrointestinal: Bowel Sounds present, soft, nontender, nondistended, no guarding, no rebound  Extremities: No peripheral edema  Vascular: 2+ peripheral pulses  Neurological: A/O x 3, no focal deficits  Musculoskeletal: 5/5 strength b/l upper and lower extremities  Skin: No rashes

## 2025-01-02 NOTE — PROGRESS NOTE ADULT - PROBLEM SELECTOR PLAN 2
Thoracic Surgery Progress Note  2022 7:09 AM     Patient: Adal Varner   MRN: [id number]   : 1992     SUBJECTIVE  No acute events overnight. Noted that pt's hgb has decreased over the last 2-3 days. Pt seen and examined. He feels well this morning. His pain is well controlled. He prefers to avoid using the PCA too much because he's worried about constipation, but stool softeners have been helping. He has been ambulatory without significant SOB. He denies any fever or chills. He continues to use his incentive spirometer. Pt denies any bleeding - no hematuria, nosebleeds, black/bloody stool, no bloody drainage from his chest tube site. Chest tube output serosanguinous and minimal.     PHYSICAL EXAM  Vitals:  Blood pressure 131/81, pulse 97, temperature 97.3 °F (36.3 °C), temperature source Oral, resp. rate 20, height 5' 10\" (1.778 m), weight 135 kg (297 lb 9.9 oz), SpO2 97 %.    General: morbidly obese, no acute distress  HEENT: Normocephalic, atraumatic  CV: Tachycardic  Resp: non labored breathing on nasal cannula  GI: abdomen obese, soft, nontender, nondistended  MSK/Ext: warm and well perfused, no edema. Right chest tube sites with small amount of serosanguinous drainage on dressings.  Neuro: Alert  Skin: warm, dry    INTAKE/OUTPUT  I/O last 3 completed shifts:  In: 1080 [P.O.:1080]  Out: 1026 [Urine:950; Chest Tube:76]     LABS  WBC (K/mcL)   Date Value   2022 22.6 (H)     RBC (mil/mcL)   Date Value   2022 2.82 (L)     HCT (%)   Date Value   2022 24.5 (L)     HGB (g/dL)   Date Value   2022 7.4 (L)     PLT (K/mcL)   Date Value   2022 600 (H)       Sodium (mmol/L)   Date Value   2022 139     Potassium (mmol/L)   Date Value   2022 3.4     Chloride (mmol/L)   Date Value   2022 104     Glucose (mg/dL)   Date Value   2022 170 (H)     Calcium (mg/dL)   Date Value   2022 8.0 (L)     Carbon Dioxide (mmol/L)   Date Value   2022 30     BUN  (mg/dL)   Date Value   11/14/2022 6     Creatinine (mg/dL)   Date Value   11/14/2022 0.54 (L)       GOT/AST (Units/L)   Date Value   11/13/2022 28     GPT/ALT (Units/L)   Date Value   11/13/2022 25     No results found for: GGTP  Alkaline Phosphatase (Units/L)   Date Value   11/13/2022 125 (H)     Bilirubin, Total (mg/dL)   Date Value   11/13/2022 2.2 (H)       IMAGING  XR CHEST PA OR AP 1 VIEW    Result Date: 11/8/2022  EXAM: XR CHEST PA OR AP 1 VIEW TECHNIQUE:Portable upright chest 0757 hours COMPARISON: CT scan the chest November 7, 2022 demonstrates large loculated right pleural effusion gas collections compatible with pleural abscess/empyema.  Among other findings PROVIDED CLINICAL INFORMATION: empyema. 5' 10\" ft 300.6 lb FINDINGS\IMPRESSION SINCE THE PRIOR COMPARISON EXAMINATION(S) , THERE IS NO INTERVAL CHANGE. 1.    Large focal right lung opacity represents loculated pleural effusion 2.    Small circumferential pleural fluid density toward the apex 3.   There is no identifiable pneumothorax/pneumomediastinum. Electronically Signed by: ANALIA SANCHEZ MD Signed on: 11/8/2022 9:04 AM      Assessment:  30 year old M with HTN and uncontrolled DM who was recently diagnosed with pneumonia and initiated on PO antibiotics as an outpatient now presenting with worsening shortness of breath. Initial labs with significant leukocytosis to 32k and CT chest concerning for empyema. He was transferred to [facility] for Thoracic Surgery evaluation, now s/p right empyema drainage and decortication 11/10/22.    Plan:  N: Dilaudid PCA, Tylenol PRN.     CV: Tachycardia improving, continue to monitor     R: On 1L NC today, continue to monitor for respiratory distress, 2 right chest tubes to suction, daily CXR while chest tubes in place. Continue incentive spirometry/Acapella     GI: Carb controlled diet, bowel regimen with Colace and Miralax     /Renal: Voiding, replete electrolytes PRN, IVF 50cc/hr     Endo: Glucose goal <180.  Primary team/Endocrine managing insulin.     Heme: Daily labs; monitor H&H, transfuse if hgb <7, hgb downtrending, no signs of bleeding, will continue to trend. Heparin 5000u q12hr DVT ppx     ID: ID following. Continue abx per ID. Follow up OR cultures.     MSK: Ambulate, out of bed activity.    Discussed with Dr. Abhishek Chapin MD PGY-1  Thoracic Surgery      Addendum:  Patient seen and examined with the resident on rounds, labs, imaging and chart reviewed. I agree with the resident note with the below modifications.   Sitting up in chair, on nasal cannula  VSS  Chest tubes with yellow serous fluid, Pleurovacs on suction  CXR lungs expanded, and much improved from 11-7-22 CXR  Cultures show strep intermedius  Hb-7.4  Patient with hematuria, will send u/a and notify medical attending    Tristin Weiss MD       Elevated trop (12/31) 151.9-->297.9, EKG changes.  Pt denies cardiac hx.  -  stress test today to evaluate for ischemia; echo ordered

## 2025-01-02 NOTE — DISCHARGE NOTE PROVIDER - NSDCFUADDAPPT_GEN_ALL_CORE_FT
APPTS ARE READY TO BE MADE: [X] YES    Best Family or Patient Contact (if needed):    Additional Information about above appointments (if needed):    1: Dr. Nakia Palomino  2: Dr. Fidel Richardson  3:  APPTS ARE READY TO BE MADE: [X] YES    Endocrinology Dr. Nakia Palomino, within 1-2 weeks  Primary Care Dr. Fidel Richardson, within 1-2 weeks

## 2025-01-02 NOTE — DISCHARGE NOTE PROVIDER - NSDCCAREPROVSEEN_GEN_ALL_CORE_FT
Krishan, Home Lofton, Alex Lion, Shanae Ramos, Jordan Rios, Nakia Mcmillan, Fab Winters, Edmundo Hamilton, Demian Wilkinson, Cl MARINELLI Home Nickerson (Critical Care Team)  Alex Lofton (Cardiology)  Jordan Ramos (Critical Care Team)  Nakia Rios (Endocrinology)  Fab Mcmillan (Cardiology)  Cl Wilkinson (Medicine)  Mello Alfredo (Medicine)  Edmundo Winters (Medicine)  Demian Hamilton (Cardiology)

## 2025-01-02 NOTE — PROGRESS NOTE ADULT - SUBJECTIVE AND OBJECTIVE BOX
PCP:    REQUESTING PHYSICIAN:    REASON FOR CONSULT:    CHIEF COMPLAINT:    HPI:   54-year-old female with history of SVT in the past Which she states was several years ago, hypothyroidism presents for sudden onset of palpitations described as heart racing that began at 8 PM last night.  Patient states that this occurred at rest.  Patient notes that her "heart is pounding" but denies any pressure or pain.  Patient denies any nausea or vomiting.  Patient has had a cough for the last few days and tested positive for RSV.  Patient was concerned that cough medicine she was taking which may have  contain phenylephrine might lead to the  palpitations.  Patient was found to be in SVT with a heart rate of 178.  Attempted to convert to sinus rhythm using vagal maneuvers at bedside which were unsuccessful.  Will get cardiac workup and administer adenosine starting with a dose of 6 mg and if unsuccessful will give a dose of 12 mg IV. (31 Dec 2024 07:52)    25: Feels improved. No further palpitations. NSR without arrhythmia  25:  Pt sitting in bed in NAD.  Denies CP, SOB, palpitations, dizziness, lightheadedness.  Feels better today.  Tele:  SB-SR 40-60s, episode HR 30 while sleeping at 5am, brief ST 140s this am      PAST MEDICAL & SURGICAL HISTORY:  Hypothyroidism  Fixation hardware in leg    SOCIAL HISTORY:    FAMILY HISTORY:  FH: arrhythmia (Mother)    ALLERGIES:  Allergies  No Known Allergies      MEDICATIONS:  Home Medications:  Adderall XR 15 mg oral capsule, extended release: 1 cap(s) orally once a day as needed (31 Dec 2024 09:32)    MEDICATIONS  (STANDING):  chlorhexidine 4% Liquid 1 Application(s) Topical <User Schedule>  enoxaparin Injectable 40 milliGRAM(s) SubCutaneous every 24 hours  metoprolol tartrate 25 milliGRAM(s) Oral two times a day    MEDICATIONS  (PRN):  acetaminophen     Tablet .. 650 milliGRAM(s) Oral every 6 hours PRN Mild Pain (1 - 3)  aluminum hydroxide/magnesium hydroxide/simethicone Suspension 30 milliLiter(s) Oral every 4 hours PRN Dyspepsia  melatonin 3 milliGRAM(s) Oral at bedtime PRN Insomnia  ondansetron Injectable 4 milliGRAM(s) IV Push every 6 hours PRN Nausea and/or Vomiting      Vital Signs Last 24 Hrs  T(C): 36.6 (2025 08:25), Max: 36.6 (2025 08:25)  T(F): 97.8 (2025 08:25), Max: 97.8 (2025 08:25)  HR: 60 (2025 08:25) (60 - 63)  BP: 139/77 (2025 08:25) (136/87 - 139/77)  BP(mean): --  RR: 18 (2025 08:25) (18 - 18)  SpO2: 98% (2025 08:25) (97% - 98%)    Parameters below as of 2025 08:25  Patient On (Oxygen Delivery Method): room air    Daily     Daily Weight in k.3 (31 Dec 2024 18:53)    I&O's Summary      PHYSICAL EXAM:  Constitutional: NAD, awake and alert, well-developed  Neck:  supple,  No JVD  Respiratory: Breath sounds are clear bilaterally, No wheezing, rales or rhonchi  Cardiovascular: S1 and S2, regular rate and rhythm, no Murmurs, gallops or rubs; no leg edema  Gastrointestinal: Bowel Sounds present, soft, nontender.   Neurological: A/O x 3, no focal deficits      LABS:                        11.0   6.89  )-----------( 189      ( 2025 07:01 )             34.5                         11.7   8.83  )-----------( 225      ( 31 Dec 2024 03:56 )             36.4     2025 07:01    137    |  108    |  10     ----------------------------<  99     3.9     |  26     |  0.76   31 Dec 2024 03:56    135    |  106    |  22     ----------------------------<  133    3.8     |  24     |  1.26     Ca    8.6        2025 07:01  Ca    8.6        31 Dec 2024 03:56  Phos  3.2       2025 07:01  Mg     2.3       2025 07:01  Mg     2.2       31 Dec 2024 03:56    TPro  8.4    /  Alb  3.1    /  TBili  0.2    /  DBili  x      /  AST  21     /  ALT  18     /  AlkPhos  76     31 Dec 2024 03:56    PT/INR - ( 31 Dec 2024 06:34 )   PT: 12.3 sec;   INR: 1.07 ratio      PTT - ( 31 Dec 2024 06:34 )  PTT:29.8 sec    Blood Culture:      @ 07:01  TSH: 61.60   @ 06:26  TSH: 52.80    trop () 151.9-->297.9  BNP () 786      EKG / CARDIAC TESTING  < from: 12 Lead ECG (20 @ 15:46) >  Diagnosis Line Sinus tachycardia  Minimal voltage criteria for LVH, may be normal variant  Septal infarct , age undetermined  ST & T wave abnormality, consider lateral ischemia  Abnormal ECG  No previous ECGs available  Confirmed by CHEPE AMARAL MD ([id number]) on 2020 10:26:34 AM  < end of copied text >    < from: 12 Lead ECG (25 @ 07:22) >  Diagnosis Line Normal sinus rhythm  Nonspecific ST and T wave abnormality  Prolonged QT  Abnormal ECG  When compared with ECG of 31-DEC-2024 13:39,  Vent. rate has decreased BY  92 BPM  < end of copied text >    RADIOLOGY  < from: Xray Chest 1 View- PORTABLE-Urgent (24 @ 04:22) >  IMPRESSION:  Noradiographic evidence of acute cardiopulmonary disease.  < end of copied text >         REASON FOR VISIT: Palpitations / SVT    HPI:  54-year-old female with history of SVT and hypothyroidism admitted on 12/31/24 after presenting with recurrence of SVT associated with RSV infection.    1/1/25: Feels improved. No further palpitations. NSR without arrhythmia  1/2/25:  Pt sitting in bed in NAD.  Denies CP, SOB, palpitations, dizziness, lightheadedness.  Feels better today.  Tele:  SB-SR 40-60s, episode HR 30 while sleeping at 5am, brief ST 140s this am    MEDICATIONS  (STANDING):  chlorhexidine 4% Liquid 1 Application(s) Topical <User Schedule>  enoxaparin Injectable 40 milliGRAM(s) SubCutaneous every 24 hours  metoprolol tartrate 25 milliGRAM(s) Oral two times a day    MEDICATIONS  (PRN):  acetaminophen     Tablet .. 650 milliGRAM(s) Oral every 6 hours PRN Mild Pain (1 - 3)  aluminum hydroxide/magnesium hydroxide/simethicone Suspension 30 milliLiter(s) Oral every 4 hours PRN Dyspepsia  melatonin 3 milliGRAM(s) Oral at bedtime PRN Insomnia  ondansetron Injectable 4 milliGRAM(s) IV Push every 6 hours PRN Nausea and/or Vomiting    Vital Signs Last 24 Hrs  T(C): 36.6 (02 Jan 2025 08:25), Max: 36.6 (02 Jan 2025 08:25)  T(F): 97.8 (02 Jan 2025 08:25), Max: 97.8 (02 Jan 2025 08:25)  HR: 60 (02 Jan 2025 08:25) (60 - 63)  BP: 139/77 (02 Jan 2025 08:25) (136/87 - 139/77)  RR: 18 (02 Jan 2025 08:25) (18 - 18)  SpO2: 98% (02 Jan 2025 08:25) (97% - 98%)  Patient On (Oxygen Delivery Method): room air    PHYSICAL EXAM:  Constitutional: NAD, awake and alert, well-developed  Neck:  supple,  No JVD  Respiratory: Breath sounds are clear bilaterally  Cardiovascular: S1 and S2, regular rate and rhythm  Gastrointestinal: Bowel Sounds present, soft, nontender.   Neurological: A/O x 3, no focal deficits    LABS:                      11.0   6.89  )-----------( 189      ( 01 Jan 2025 07:01 )             34.5     137  |  108  |  10  ----------------------------<  99  3.9   |  26  |  0.76    Ca    8.6      01 Jan 2025 07:01  Phos  3.2     01-01  Mg     2.3     01-01    TroponinI hsT: <-297.93, <-151.98    trop (12/31) 151.9-->297.9  BNP (12/31) 786    EKG / CARDIAC TESTING  12 Lead ECG (07.21.20 @ 15:46):  Sinus tachycardia  Minimal voltage criteria for LVH, may be normal variant  Septal infarct , age undetermined  ST & T wave abnormality, consider lateral ischemia  Abnormal ECG    12 Lead ECG (01.01.25 @ 07:22):   Normal sinus rhythm  Nonspecific ST and T wave abnormality  Prolonged QT  Abnormal ECG  When compared with ECG of 31-DEC-2024 13:39, Vent. rate has decreased BY  92 BPM  < end of copied text >    RADIOLOGY  Xray Chest 1 View- PORTABLE-Urgent (12.31.24 @ 04:22): Noradiographic evidence of acute cardiopulmonary disease.

## 2025-01-02 NOTE — DISCHARGE NOTE PROVIDER - NSDCMRMEDTOKEN_GEN_ALL_CORE_FT
Adderall XR 15 mg oral capsule, extended release: 1 cap(s) orally once a day as needed   Adderall XR 15 mg oral capsule, extended release: 1 cap(s) orally once a day as needed  metoprolol tartrate 25 mg oral tablet: 1 tab(s) orally 2 times a day  Synthroid 88 mcg (0.088 mg) oral tablet: 1 tab(s) orally once a day

## 2025-01-02 NOTE — DISCHARGE NOTE PROVIDER - NSDCCPCAREPLAN_GEN_ALL_CORE_FT
PRINCIPAL DISCHARGE DIAGNOSIS  Diagnosis: SVT (supraventricular tachycardia)  Assessment and Plan of Treatment:       SECONDARY DISCHARGE DIAGNOSES  Diagnosis: Elevated troponin level  Assessment and Plan of Treatment:     Diagnosis: Severe hypothyroidism  Assessment and Plan of Treatment: 2 days of synthroid 75 mcg and then 88 mcg po daily  - Follow up with Endo    Diagnosis: Acute URI due to respiratory syncytial virus (RSV)  Assessment and Plan of Treatment: supportive care     PRINCIPAL DISCHARGE DIAGNOSIS  Diagnosis: SVT (supraventricular tachycardia)  Assessment and Plan of Treatment: In setting of taking phenylephrine decongestant, RSV infection, likely also some mild dehydration, and finding of severe hypothyroidism. See below. SVT resolved with adenosine. Avoid phenylephrine. Continue low dose metoprolol.      SECONDARY DISCHARGE DIAGNOSES  Diagnosis: RSV infection  Assessment and Plan of Treatment: No fever. No hypoxia. No sign of pneumonia or other complicating issues. Clinically improved with supportive care measures.    Diagnosis: Elevated troponin level  Assessment and Plan of Treatment: Likely myocardia demand related without infarction. Echocardiogram with normal heart squeezing function, no regional wall motion abnormalities. Stress testing negative for ischemia.    Diagnosis: Severe hypothyroidism  Assessment and Plan of Treatment: TSH was elevated up to 83, free T4 0.4. You were treated with levothyroxine, increased to 75mcg, planned on further increased dose of 88mcg upon discharge. Follow up as outpatient via TeleHealth with Endocrinology Dr. Nakia Rios.

## 2025-01-02 NOTE — DISCHARGE NOTE PROVIDER - PROVIDER TOKENS
FREE:[LAST:[Jayda],FIRST:[Nakia],PHONE:[(   )    -],FAX:[(   )    -],ADDRESS:[Tele Endocrine],FOLLOWUP:[1 week]],PROVIDER:[TOKEN:[[id number]],FOLLOWUP:[2 weeks]] PROVIDER:[TOKEN:[[id number]],FOLLOWUP:[2 weeks]],FREE:[LAST:[Jayda],FIRST:[Nakia Hurtado],PHONE:[phone]:[(   )    -],ADDRESS:[Endocrinology - [facility]],FOLLOWUP:[1 week]]

## 2025-01-03 ENCOUNTER — TRANSCRIPTION ENCOUNTER (OUTPATIENT)
Age: 55
End: 2025-01-03

## 2025-01-03 VITALS — DIASTOLIC BLOOD PRESSURE: 72 MMHG | SYSTOLIC BLOOD PRESSURE: 119 MMHG | HEART RATE: 68 BPM | OXYGEN SATURATION: 100 %

## 2025-01-03 LAB
T4 FREE SERPL-MCNC: 0.5 NG/DL — LOW (ref 0.93–1.7)
TSH SERPL-MCNC: 49 UU/ML — HIGH (ref 0.34–4.82)

## 2025-01-03 PROCEDURE — 99232 SBSQ HOSP IP/OBS MODERATE 35: CPT

## 2025-01-03 PROCEDURE — 99239 HOSP IP/OBS DSCHRG MGMT >30: CPT

## 2025-01-03 RX ORDER — METOPROLOL TARTRATE 50 MG
1 TABLET ORAL
Qty: 60 | Refills: 0
Start: 2025-01-03

## 2025-01-03 RX ORDER — LEVOTHYROXINE SODIUM 175 UG/1
1 TABLET ORAL
Qty: 30 | Refills: 0
Start: 2025-01-03

## 2025-01-03 RX ADMIN — CHLORHEXIDINE GLUCONATE 1 APPLICATION(S): 1.2 RINSE ORAL at 09:06

## 2025-01-03 RX ADMIN — ENOXAPARIN SODIUM 40 MILLIGRAM(S): 60 INJECTION INTRAVENOUS; SUBCUTANEOUS at 05:29

## 2025-01-03 RX ADMIN — Medication 25 MILLIGRAM(S): at 09:05

## 2025-01-03 RX ADMIN — LEVOTHYROXINE SODIUM 75 MICROGRAM(S): 175 TABLET ORAL at 05:29

## 2025-01-03 NOTE — DISCHARGE NOTE NURSING/CASE MANAGEMENT/SOCIAL WORK - NSDCPEFALRISK_GEN_ALL_CORE
For information on Fall & Injury Prevention, visit: [email or web address] OR  [email or web address] OR  https://www.cdc.gov/steadi/patient.html

## 2025-01-03 NOTE — DISCHARGE NOTE NURSING/CASE MANAGEMENT/SOCIAL WORK - FINANCIAL ASSISTANCE
[facility] provides services at a reduced cost to those who are determined to be eligible through [facility]’s financial assistance program. Information regarding [facility]’s financial assistance program can be found by going to [email or web address] or by calling [phone].

## 2025-01-03 NOTE — PROGRESS NOTE ADULT - PROBLEM SELECTOR PLAN 2
Elevated trop (12/31) 151.9-->297.9, EKG changes.  Pt denies cardiac hx.  -  Nuclear stress test reveals NL perfusion.  Echo NL LVF, Mild MR/TR, Mild-Mod LVH Elevated trop (12/31) 151.9-->297.9, EKG changes.  Pt denies cardiac hx.  -  Nuclear stress test reveals NL perfusion.  Echo NL LVF, Mild MR/TR, Mild-Mod LVH    Will sign off.  Pt will follow up with  in in 1-2 weeks

## 2025-01-03 NOTE — PROGRESS NOTE ADULT - SUBJECTIVE AND OBJECTIVE BOX
REASON FOR VISIT: Palpitations / SVT    HPI:  54-year-old female with history of SVT and hypothyroidism admitted on 12/31/24 after presenting with recurrence of SVT associated with RSV infection.    1/1/25: Feels improved. No further palpitations. NSR without arrhythmia  1/2/25:  Pt sitting in bed in NAD.  Denies CP, SOB, palpitations, dizziness, lightheadedness.  Feels better today.  Tele:  SB-SR 40-60s, episode HR 30 while sleeping at 5am, brief ST 140s this am  1/3/25:     MEDICATIONS  (STANDING):  chlorhexidine 4% Liquid 1 Application(s) Topical <User Schedule>  enoxaparin Injectable 40 milliGRAM(s) SubCutaneous every 24 hours  levothyroxine 75 MICROGram(s) Oral daily  metoprolol tartrate 25 milliGRAM(s) Oral two times a day    MEDICATIONS  (PRN):  acetaminophen     Tablet .. 650 milliGRAM(s) Oral every 6 hours PRN Mild Pain (1 - 3)  aluminum hydroxide/magnesium hydroxide/simethicone Suspension 30 milliLiter(s) Oral every 4 hours PRN Dyspepsia  melatonin 3 milliGRAM(s) Oral at bedtime PRN Insomnia  ondansetron Injectable 4 milliGRAM(s) IV Push every 6 hours PRN Nausea and/or Vomiting    Vital Signs Last 24 Hrs  T(C): 36.3 (03 Jan 2025 08:45), Max: 36.4 (02 Jan 2025 15:50)  T(F): 97.4 (03 Jan 2025 08:45), Max: 97.5 (02 Jan 2025 15:50)  HR: 68 (03 Jan 2025 11:45) (64 - 70)  BP: 119/72 (03 Jan 2025 11:45) (119/71 - 137/72)  BP(mean): --  RR: 18 (03 Jan 2025 08:45) (18 - 18)  SpO2: 100% (03 Jan 2025 11:45) (96% - 100%)    Parameters below as of 03 Jan 2025 11:45  Patient On (Oxygen Delivery Method): room air      PHYSICAL EXAM:  Constitutional: NAD, awake and alert, well-developed  Neck:  supple,  No JVD  Respiratory: Breath sounds are clear bilaterally  Cardiovascular: S1 and S2, regular rate and rhythm  Gastrointestinal: Bowel Sounds present, soft, nontender.   Neurological: A/O x 3, no focal deficits    LABS:                          11.0   6.89  )-----------( 189      ( 01 Jan 2025 07:01 )             34.5         137  |  108  |  10  ----------------------------<  99  3.9   |  26  |  0.76    Ca    8.6      01 Jan 2025 07:01  Phos  3.2     01-01  Mg     2.3     01-01    TroponinI hsT: <-297.93, <-151.98    trop (12/31) 151.9-->297.9  BNP (12/31) 786    EKG / CARDIAC TESTING  12 Lead ECG (07.21.20 @ 15:46):  Sinus tachycardia  Minimal voltage criteria for LVH, may be normal variant  Septal infarct , age undetermined  ST & T wave abnormality, consider lateral ischemia  Abnormal ECG    12 Lead ECG (01.01.25 @ 07:22):   Normal sinus rhythm  Nonspecific ST and T wave abnormality  Prolonged QT  Abnormal ECG  When compared with ECG of 31-DEC-2024 13:39, Vent. rate has decreased BY  92 BPM  < end of copied text >    RADIOLOGY  Xray Chest 1 View- PORTABLE-Urgent (12.31.24 @ 04:22): Noradiographic evidence of acute cardiopulmonary disease.      < from: TTE W or WO Ultrasound Enhancing Agent (01.02.25 @ 12:51) >  FINDINGS:     Left Ventricle:  The left ventricular cavity is normal in size. Left ventricular wall thickness is mildly increased. Left ventricular systolic function is normal with an ejection fraction visually estimated at 55 to 60%. Mild to moderate left ventricular hypertrophy. There is mild (grade 1) left ventricular diastolic dysfunction.     Right Ventricle:  The right ventricular cavity is normal in size and right ventricular systolic function is normal. Tricuspid annular plane systolic excursion (TAPSE) is 2.1 cm (normal >=1.7 cm).     Left Atrium:  The left atrium is normal in size with an indexed volume of 30.09 ml/m².     Right Atrium:  The right atrium is normal in size.     Interatrial Septum:  The interatrial septum appears intact.     Aortic Valve:  The aortic valve is tricuspid with normal leaflet excursion.     Mitral Valve:  Structurally normal mitral valve with normal leaflet excursion. There is mild mitral regurgitation.     Tricuspid Valve:  Structurally normal tricuspid valve with normal leaflet excursion. There is mild tricuspid regurgitation. Estimated pulmonary artery systolic pressure is 21 mmHg.     Pulmonic Valve:  Structurally normal pulmonic valve with normal leaflet excursion.     Aorta:  The aortic root at the sinuses of Valsalva is normal in size, measuring 3.20 cm (indexed 1.49 cm/m²). The ascending aorta is normal in size, measuring 3.30 cm (indexed 1.53 cm/m²).     Pericardium:  No pericardial effusion seen.     Systemic Veins:  The inferior vena cava is normal in size (normal <2.1cm) with normal inspiratory collapse (normal >50%) consistent with normal right atrial pressure (~3, range 0-5mmHg).    < end of copied text >    < from: Nuclear Stress Test-Exercise.. (01.02.25 @ 11:48) >  Conclusions:   1. Normal myocardial perfusion scan, with no evidence of infarction or inducible ischemia.   2. Baseline electrocardiogram: sinus rhythm at a rate of 70 bpm.   3. Stress electrocardiogram: No significant ischemic ST segment changes.   4. Patient achieved 10.1 METS, which is consistent with good exercise capacity.   5. The left ventricle is normal in function. The post stress left ventricular EF is 71 %. The stress end diastolic volume is 82 ml and systolic volume is 24 ml.    < end of copied text >     REASON FOR VISIT: Palpitations / SVT    HPI:  54-year-old female with history of SVT and hypothyroidism admitted on 12/31/24 after presenting with recurrence of SVT associated with RSV infection.    1/1/25: Feels improved. No further palpitations. NSR without arrhythmia  1/2/25:  Pt sitting in bed in NAD.  Denies CP, SOB, palpitations, dizziness, lightheadedness.  Feels better today.  Tele:  SB-SR 40-60s, episode HR 30 while sleeping at 5am, brief ST 140s this am  1/3/25: Pt resting in bed. Denies cp, sob, palpitations.  Tele: RSR    MEDICATIONS  (STANDING):  chlorhexidine 4% Liquid 1 Application(s) Topical <User Schedule>  enoxaparin Injectable 40 milliGRAM(s) SubCutaneous every 24 hours  levothyroxine 75 MICROGram(s) Oral daily  metoprolol tartrate 25 milliGRAM(s) Oral two times a day    MEDICATIONS  (PRN):  acetaminophen     Tablet .. 650 milliGRAM(s) Oral every 6 hours PRN Mild Pain (1 - 3)  aluminum hydroxide/magnesium hydroxide/simethicone Suspension 30 milliLiter(s) Oral every 4 hours PRN Dyspepsia  melatonin 3 milliGRAM(s) Oral at bedtime PRN Insomnia  ondansetron Injectable 4 milliGRAM(s) IV Push every 6 hours PRN Nausea and/or Vomiting    Vital Signs Last 24 Hrs  T(C): 36.3 (03 Jan 2025 08:45), Max: 36.4 (02 Jan 2025 15:50)  T(F): 97.4 (03 Jan 2025 08:45), Max: 97.5 (02 Jan 2025 15:50)  HR: 68 (03 Jan 2025 11:45) (64 - 70)  BP: 119/72 (03 Jan 2025 11:45) (119/71 - 137/72)  BP(mean): --  RR: 18 (03 Jan 2025 08:45) (18 - 18)  SpO2: 100% (03 Jan 2025 11:45) (96% - 100%)    Parameters below as of 03 Jan 2025 11:45  Patient On (Oxygen Delivery Method): room air      PHYSICAL EXAM:  Constitutional: NAD, awake and alert, well-developed  Neck:  supple,  No JVD  Respiratory: Breath sounds are clear bilaterally  Cardiovascular: S1 and S2, regular rate and rhythm  Gastrointestinal: Bowel Sounds present, soft, nontender.   Neurological: A/O x 3, no focal deficits    LABS:                          11.0   6.89  )-----------( 189      ( 01 Jan 2025 07:01 )             34.5         137  |  108  |  10  ----------------------------<  99  3.9   |  26  |  0.76    Ca    8.6      01 Jan 2025 07:01  Phos  3.2     01-01  Mg     2.3     01-01    TroponinI hsT: <-297.93, <-151.98    trop (12/31) 151.9-->297.9  BNP (12/31) 786    EKG / CARDIAC TESTING  12 Lead ECG (07.21.20 @ 15:46):  Sinus tachycardia  Minimal voltage criteria for LVH, may be normal variant  Septal infarct , age undetermined  ST & T wave abnormality, consider lateral ischemia  Abnormal ECG    12 Lead ECG (01.01.25 @ 07:22):   Normal sinus rhythm  Nonspecific ST and T wave abnormality  Prolonged QT  Abnormal ECG  When compared with ECG of 31-DEC-2024 13:39, Vent. rate has decreased BY  92 BPM  < end of copied text >    RADIOLOGY  Xray Chest 1 View- PORTABLE-Urgent (12.31.24 @ 04:22): Noradiographic evidence of acute cardiopulmonary disease.      < from: TTE W or WO Ultrasound Enhancing Agent (01.02.25 @ 12:51) >  FINDINGS:     Left Ventricle:  The left ventricular cavity is normal in size. Left ventricular wall thickness is mildly increased. Left ventricular systolic function is normal with an ejection fraction visually estimated at 55 to 60%. Mild to moderate left ventricular hypertrophy. There is mild (grade 1) left ventricular diastolic dysfunction.     Right Ventricle:  The right ventricular cavity is normal in size and right ventricular systolic function is normal. Tricuspid annular plane systolic excursion (TAPSE) is 2.1 cm (normal >=1.7 cm).     Left Atrium:  The left atrium is normal in size with an indexed volume of 30.09 ml/m².     Right Atrium:  The right atrium is normal in size.     Interatrial Septum:  The interatrial septum appears intact.     Aortic Valve:  The aortic valve is tricuspid with normal leaflet excursion.     Mitral Valve:  Structurally normal mitral valve with normal leaflet excursion. There is mild mitral regurgitation.     Tricuspid Valve:  Structurally normal tricuspid valve with normal leaflet excursion. There is mild tricuspid regurgitation. Estimated pulmonary artery systolic pressure is 21 mmHg.     Pulmonic Valve:  Structurally normal pulmonic valve with normal leaflet excursion.     Aorta:  The aortic root at the sinuses of Valsalva is normal in size, measuring 3.20 cm (indexed 1.49 cm/m²). The ascending aorta is normal in size, measuring 3.30 cm (indexed 1.53 cm/m²).     Pericardium:  No pericardial effusion seen.     Systemic Veins:  The inferior vena cava is normal in size (normal <2.1cm) with normal inspiratory collapse (normal >50%) consistent with normal right atrial pressure (~3, range 0-5mmHg).    < end of copied text >    < from: Nuclear Stress Test-Exercise.. (01.02.25 @ 11:48) >  Conclusions:   1. Normal myocardial perfusion scan, with no evidence of infarction or inducible ischemia.   2. Baseline electrocardiogram: sinus rhythm at a rate of 70 bpm.   3. Stress electrocardiogram: No significant ischemic ST segment changes.   4. Patient achieved 10.1 METS, which is consistent with good exercise capacity.   5. The left ventricle is normal in function. The post stress left ventricular EF is 71 %. The stress end diastolic volume is 82 ml and systolic volume is 24 ml.    < end of copied text >

## 2025-01-03 NOTE — PROGRESS NOTE ADULT - PROBLEM SELECTOR PLAN 1
Presents with palpitations.  Pt with cough and +RSV, took cough medicine containing phenylephrine.  Found to have SVT with HR 170s in ER.  Denies cardiac hx.  -  tele reviewed SB-SR 40-60s, episode HR 30 while sleeping at 5am, brief episode SVT 140s this am; cont tele monitor  -  denies CV sxs; cont metoprolol with parameters; avoid phenylephrine Presents with palpitations, likely due to cough medicine containing phenylephrine, pt +RSV.  Found to have SVT with HR 170s in ER.  Denies cardiac hx.  -  RSR on tele. No further episodes of SVT  - cont metoprolol with parameters; avoid phenylephrine

## 2025-01-03 NOTE — PROGRESS NOTE ADULT - NS ATTEND AMEND GEN_ALL_CORE FT
Agree with above.   pSVT in setting RSV : cont metoprolol  Elevated troponin: probable demand ischemia. nuclear stress negative for ischemia    will sign off. please reconsult as needed

## 2025-01-03 NOTE — DISCHARGE NOTE NURSING/CASE MANAGEMENT/SOCIAL WORK - NSDCFUADDAPPT_GEN_ALL_CORE_FT
APPTS ARE READY TO BE MADE: [X] YES    Endocrinology Dr. Nakia Palomino, within 1-2 weeks  Primary Care Dr. Fidel Richardson, within 1-2 weeks APPTS ARE READY TO BE MADE: [X] YES    Endocrinology Dr. Nakia Palomino, within 1-2 weeks  Primary Care Dr. Fidel Richardson, within 1-2 weeks      ***CHF FOLLOW UP APPOINTMENT ON 1/10/25 AT 1PM WITH ANNE MARIE OCHOA.

## 2025-01-03 NOTE — DISCHARGE NOTE NURSING/CASE MANAGEMENT/SOCIAL WORK - PATIENT PORTAL LINK FT
You can access the FollowMyHealth Patient Portal offered by [facility] by registering at the following website: [email or web address]. By joining [employer]’s FollowMyHealth portal, you will also be able to view your health information using other applications (apps) compatible with our system.

## 2025-01-04 ENCOUNTER — NON-APPOINTMENT (OUTPATIENT)
Age: 55
End: 2025-01-04

## 2025-01-09 DIAGNOSIS — Z79.890 HORMONE REPLACEMENT THERAPY: ICD-10-CM

## 2025-01-09 DIAGNOSIS — E03.9 HYPOTHYROIDISM, UNSPECIFIED: ICD-10-CM

## 2025-01-09 DIAGNOSIS — I5A NON-ISCHEMIC MYOCARDIAL INJURY (NON-TRAUMATIC): ICD-10-CM

## 2025-01-09 DIAGNOSIS — Z96.7 PRESENCE OF OTHER BONE AND TENDON IMPLANTS: ICD-10-CM

## 2025-01-09 DIAGNOSIS — I47.10 SUPRAVENTRICULAR TACHYCARDIA, UNSPECIFIED: ICD-10-CM

## 2025-01-09 DIAGNOSIS — B97.4 RESPIRATORY SYNCYTIAL VIRUS AS THE CAUSE OF DISEASES CLASSIFIED ELSEWHERE: ICD-10-CM

## 2025-01-09 DIAGNOSIS — Z11.52 ENCOUNTER FOR SCREENING FOR COVID-19: ICD-10-CM

## 2025-01-09 DIAGNOSIS — J06.9 ACUTE UPPER RESPIRATORY INFECTION, UNSPECIFIED: ICD-10-CM

## 2025-01-10 ENCOUNTER — APPOINTMENT (OUTPATIENT)
Dept: CARDIOLOGY | Facility: CLINIC | Age: 55
End: 2025-01-10

## 2025-01-14 ENCOUNTER — APPOINTMENT (OUTPATIENT)
Dept: FAMILY MEDICINE | Facility: CLINIC | Age: 55
End: 2025-01-14

## 2025-01-17 ENCOUNTER — NON-APPOINTMENT (OUTPATIENT)
Age: 55
End: 2025-01-17

## 2025-02-21 ENCOUNTER — APPOINTMENT (OUTPATIENT)
Dept: FAMILY MEDICINE | Facility: CLINIC | Age: 55
End: 2025-02-21
Payer: COMMERCIAL

## 2025-02-21 ENCOUNTER — NON-APPOINTMENT (OUTPATIENT)
Age: 55
End: 2025-02-21

## 2025-02-21 VITALS
DIASTOLIC BLOOD PRESSURE: 70 MMHG | BODY MASS INDEX: 34.53 KG/M2 | WEIGHT: 220 LBS | SYSTOLIC BLOOD PRESSURE: 120 MMHG | HEIGHT: 67 IN | OXYGEN SATURATION: 98 % | TEMPERATURE: 98.1 F | HEART RATE: 107 BPM

## 2025-02-21 DIAGNOSIS — Z78.9 OTHER SPECIFIED HEALTH STATUS: ICD-10-CM

## 2025-02-21 DIAGNOSIS — Z80.3 FAMILY HISTORY OF MALIGNANT NEOPLASM OF BREAST: ICD-10-CM

## 2025-02-21 DIAGNOSIS — E03.9 HYPOTHYROIDISM, UNSPECIFIED: ICD-10-CM

## 2025-02-21 DIAGNOSIS — Z82.49 FAMILY HISTORY OF ISCHEMIC HEART DISEASE AND OTHER DISEASES OF THE CIRCULATORY SYSTEM: ICD-10-CM

## 2025-02-21 DIAGNOSIS — I47.10 SUPRAVENTRICULAR TACHYCARDIA, UNSPECIFIED: ICD-10-CM

## 2025-02-21 PROCEDURE — 99396 PREV VISIT EST AGE 40-64: CPT

## 2025-03-18 ENCOUNTER — NON-APPOINTMENT (OUTPATIENT)
Age: 55
End: 2025-03-18

## 2025-03-25 ENCOUNTER — APPOINTMENT (OUTPATIENT)
Dept: FAMILY MEDICINE | Facility: CLINIC | Age: 55
End: 2025-03-25

## 2025-04-21 ENCOUNTER — NON-APPOINTMENT (OUTPATIENT)
Age: 55
End: 2025-04-21

## 2025-06-08 ENCOUNTER — NON-APPOINTMENT (OUTPATIENT)
Age: 55
End: 2025-06-08